# Patient Record
Sex: FEMALE | Race: WHITE | Employment: FULL TIME | URBAN - METROPOLITAN AREA
[De-identification: names, ages, dates, MRNs, and addresses within clinical notes are randomized per-mention and may not be internally consistent; named-entity substitution may affect disease eponyms.]

---

## 2017-01-13 DIAGNOSIS — G89.4 CHRONIC PAIN DISORDER: Primary | ICD-10-CM

## 2017-01-13 RX ORDER — TRAMADOL HYDROCHLORIDE 50 MG/1
100 TABLET ORAL 3 TIMES DAILY PRN
Qty: 180 TABLET | Refills: 2 | Status: SHIPPED | OUTPATIENT
Start: 2017-01-13 | End: 2017-04-11

## 2017-01-13 NOTE — TELEPHONE ENCOUNTER
checked 1.13.17, monthly fills  Routing refill request to provider for review/approval because:  Drug not on the [facility] refill protocol   Patricia Collier RN, BS  Message handled by Nurse Triage .

## 2017-01-17 ENCOUNTER — TELEPHONE (OUTPATIENT)
Dept: FAMILY MEDICINE | Facility: CLINIC | Age: 57
End: 2017-01-17

## 2017-01-17 NOTE — TELEPHONE ENCOUNTER
[facility], insurance only covers 15 tablets per month for Zolpidem 5 mg, needs PA, call [phone], no other information provided, routed to Jose A Davila MD, please advise linda NAGEL for processing  Yudith Lujan RN, BSN  Message handled by Nurse Triage.

## 2017-01-18 NOTE — TELEPHONE ENCOUNTER
There's no pa that justifies exception that i know of but lets try.     Hx bipolar with stable on daily hs zolpidem for two years along with her other meds.   Current Outpatient Prescriptions   Medication     traMADol (ULTRAM) 50 MG tablet     zolpidem (AMBIEN) 5 MG tablet     PARoxetine (PAXIL) 20 MG tablet     promethazine (PHENERGAN) 12.5 MG tablet     Omeprazole 20 MG tablet     No current facility-administered medications for this visit.

## 2017-01-28 NOTE — TELEPHONE ENCOUNTER
Patient calling to check on this.  States was told would have answer in 2 days.  Wants call ASAP.  Needs med as is out.  Sarita Elliott RN

## 2017-01-31 NOTE — TELEPHONE ENCOUNTER
No response, resubmitted via CMM, see nurse message below, LMOVM informing pt, call if ?'s  Yudith Lujan RN, BSN  Message handled by Nurse Triage.

## 2017-01-31 NOTE — TELEPHONE ENCOUNTER
Fax received from [employer], instructs to call [facility] at [phone], called, processed override over the phone    PA approved.  Effective date: 1/31/17-1/31/20 (36 months), ok for 30/30 or 90/90 quantity  PA reference #: [id number]  Pt. notified:   Yes,  LMOM that PA approved and patient to notify pharmacy, also informed [facility] of PA approval, $1.32 copay  Yudith Lujan RN, BSN  Message handled by Nurse Triage.

## 2017-02-11 DIAGNOSIS — G47.9 SLEEP DISORDER: Primary | ICD-10-CM

## 2017-02-11 NOTE — TELEPHONE ENCOUNTER
Pending Prescriptions:                       Disp   Refills    zolpidem (AMBIEN) 5 MG tablet             30 tab*2            Sig: Take 1 tablet (5 mg) by mouth nightly as needed           for sleep NEEDS VISIT    Controlled Substance Refill Request for Zolpidem  Problem List Complete:  No     PROVIDER TO CONSIDER COMPLETION OF PROBLEM LIST AND OVERVIEW/CONTROLLED SUBSTANCE AGREEMENT    Last Written Prescription Date:  11/15/16  Last Fill Quantity: 30,   # refills: 2    [place] [facility] [place] 09/06/2016    Future Office visit:     Controlled substance agreement on file: Yes:  Date 04/29/16.     Processing:  Fax Rx to [facility] pharmacy   checked in past 6 months?  No, route to KRISTY Larose

## 2017-02-15 RX ORDER — ZOLPIDEM TARTRATE 5 MG/1
5 TABLET ORAL
Qty: 30 TABLET | Refills: 2 | Status: SHIPPED | OUTPATIENT
Start: 2017-02-15 | End: 2017-04-21

## 2017-04-11 DIAGNOSIS — G89.21 CHRONIC PAIN DUE TO TRAUMA: ICD-10-CM

## 2017-04-11 DIAGNOSIS — G89.4 CHRONIC PAIN DISORDER: ICD-10-CM

## 2017-04-11 NOTE — TELEPHONE ENCOUNTER
Controlled Substance Refill Request for traMADol (ULTRAM) 50 MG tablet  Sig: Take 2 tablets (100 mg) by mouth 3 times daily as needed for pain    Problem List Complete:  No     PROVIDER TO CONSIDER COMPLETION OF PROBLEM LIST AND OVERVIEW/CONTROLLED SUBSTANCE AGREEMENT    Last Written Prescription Date:  1/13/17  Last Fill Quantity: 180,   # refills: 2    Last Office Visit with [facility] primary care provider: 9/6/2016      Future Office visit:     Controlled substance agreement on file: Yes:  Date 5/9/16.     [facility]   checked in past 6 months?  Yes 2/11/17      MELINDA Torrez  April 11, 2017  11:00 AM

## 2017-04-12 RX ORDER — TRAMADOL HYDROCHLORIDE 50 MG/1
TABLET ORAL
Qty: 180 TABLET | Refills: 0 | Status: SHIPPED | OUTPATIENT
Start: 2017-04-12 | End: 2017-05-12

## 2017-04-12 NOTE — TELEPHONE ENCOUNTER
Routing refill request to provider to review approval because:  Drug not on the [facility], [facility] or  Health refill protocol or controlled substance   UPDATED, AT [facility], PT DUE FOR 6 MONTH VISIT-** CONFIRM START DATE AND QUANTITY    LAST REFILL PER : 3/14/17  Yudith Lujan RN, BSN  Message handled by Nurse Triage.

## 2017-04-21 ENCOUNTER — OFFICE VISIT (OUTPATIENT)
Dept: FAMILY MEDICINE | Facility: CLINIC | Age: 57
End: 2017-04-21
Payer: COMMERCIAL

## 2017-04-21 VITALS
DIASTOLIC BLOOD PRESSURE: 82 MMHG | SYSTOLIC BLOOD PRESSURE: 130 MMHG | HEART RATE: 85 BPM | BODY MASS INDEX: 24.96 KG/M2 | RESPIRATION RATE: 16 BRPM | OXYGEN SATURATION: 98 % | WEIGHT: 150 LBS | TEMPERATURE: 99.2 F

## 2017-04-21 DIAGNOSIS — J01.00 ACUTE NON-RECURRENT MAXILLARY SINUSITIS: Primary | ICD-10-CM

## 2017-04-21 DIAGNOSIS — G47.9 SLEEP DISORDER: ICD-10-CM

## 2017-04-21 PROCEDURE — 99213 OFFICE O/P EST LOW 20 MIN: CPT | Performed by: PHYSICIAN ASSISTANT

## 2017-04-21 RX ORDER — ZOLPIDEM TARTRATE 5 MG/1
5 TABLET ORAL
Qty: 30 TABLET | Refills: 2 | Status: SHIPPED | OUTPATIENT
Start: 2017-04-21 | End: 2017-05-08

## 2017-04-21 ASSESSMENT — ANXIETY QUESTIONNAIRES
1. FEELING NERVOUS, ANXIOUS, OR ON EDGE: MORE THAN HALF THE DAYS
6. BECOMING EASILY ANNOYED OR IRRITABLE: MORE THAN HALF THE DAYS
IF YOU CHECKED OFF ANY PROBLEMS ON THIS QUESTIONNAIRE, HOW DIFFICULT HAVE THESE PROBLEMS MADE IT FOR YOU TO DO YOUR WORK, TAKE CARE OF THINGS AT HOME, OR GET ALONG WITH OTHER PEOPLE: SOMEWHAT DIFFICULT
3. WORRYING TOO MUCH ABOUT DIFFERENT THINGS: NEARLY EVERY DAY
5. BEING SO RESTLESS THAT IT IS HARD TO SIT STILL: MORE THAN HALF THE DAYS
2. NOT BEING ABLE TO STOP OR CONTROL WORRYING: MORE THAN HALF THE DAYS
GAD7 TOTAL SCORE: 16
7. FEELING AFRAID AS IF SOMETHING AWFUL MIGHT HAPPEN: MORE THAN HALF THE DAYS

## 2017-04-21 ASSESSMENT — PATIENT HEALTH QUESTIONNAIRE - PHQ9: 5. POOR APPETITE OR OVEREATING: NEARLY EVERY DAY

## 2017-04-21 NOTE — MR AVS SNAPSHOT
After Visit Summary   4/21/2017    Miguelina Soria    MRN: [id number]           Patient Information     Date Of Birth          1960        Visit Information        Provider Department      4/21/2017 8:45 AM Kizzy Pearce PA-C [facility]        Today's Diagnoses     Acute non-recurrent maxillary sinusitis    -  1    Sleep disorder           Follow-ups after your visit        Who to contact     If you have questions or need follow up information about today's clinic visit or your schedule please contact [facility] directly at [phone].  Normal or non-critical lab and imaging results will be communicated to you by TEAM INTERVALhart, letter or phone within 4 business days after the clinic has received the results. If you do not hear from us within 7 days, please contact the clinic through TEAM INTERVALhart or phone. If you have a critical or abnormal lab result, we will notify you by phone as soon as possible.  Submit refill requests through [employer] or call your pharmacy and they will forward the refill request to us. Please allow 3 business days for your refill to be completed.          Additional Information About Your Visit        MyChart Information     [employer] gives you secure access to your electronic health record. If you see a primary care provider, you can also send messages to your care team and make appointments. If you have questions, please call your primary care clinic.  If you do not have a primary care provider, please call [phone] and they will assist you.        Care EveryWhere ID     This is your Care EveryWhere ID. This could be used by other organizations to access your [place] medical records  [id number]        Your Vitals Were     Pulse Temperature Respirations Last Period Pulse Oximetry BMI (Body Mass Index)    85 99.2  F (37.3  C) (Oral) 16 01/01/2010 98% 24.96 kg/m2       Blood Pressure from Last 3 Encounters:   04/21/17 130/82    09/06/16 134/84   04/29/16 114/72    Weight from Last 3 Encounters:   04/21/17 150 lb (68 kg)   09/06/16 145 lb (65.8 kg)   04/29/16 149 lb (67.6 kg)              We Performed the Following     Asthma Action Plan (AAP)     DEPRESSION ACTION PLAN (DAP)          Today's Medication Changes          These changes are accurate as of: 4/21/17  9:04 AM.  If you have any questions, ask your nurse or doctor.               Start taking these medicines.        Dose/Directions    amoxicillin-clavulanate 875-125 MG per tablet   Commonly known as:  AUGMENTIN   Used for:  Acute non-recurrent maxillary sinusitis   Started by:  Kizzy Pearce PA-C        Dose:  1 tablet   Take 1 tablet by mouth 2 times daily   Quantity:  20 tablet   Refills:  0         These medicines have changed or have updated prescriptions.        Dose/Directions    zolpidem 5 MG tablet   Commonly known as:  AMBIEN   This may have changed:  additional instructions   Used for:  Sleep disorder   Changed by:  Kizzy Pearce PA-C        Dose:  5 mg   Take 1 tablet (5 mg) by mouth nightly as needed for sleep   Quantity:  30 tablet   Refills:  2            Where to get your medicines      These medications were sent to [facility] [address] [place] [address] [place], [place] - [address] [facility]  [address] [place] [place] [address]     Phone:  [phone]     amoxicillin-clavulanate 875-125 MG per tablet         Some of these will need a paper prescription and others can be bought over the counter.  Ask your nurse if you have questions.     Bring a paper prescription for each of these medications     zolpidem 5 MG tablet                Primary Care Provider Office Phone # Fax #    Jose A Davila [phone]       [address] [address]        Thank you!     Thank you for choosing [facility]  for your care. Our goal is always to provide you with  excellent care. Hearing back from our patients is one way we can continue to improve our services. Please take a few minutes to complete the written survey that you may receive in the mail after your visit with us. Thank you!             Your Updated Medication List - Protect others around you: Learn how to safely use, store and throw away your medicines at www.disposemymeds.org.          This list is accurate as of: 4/21/17  9:04 AM.  Always use your most recent med list.                   Brand Name Dispense Instructions for use    amoxicillin-clavulanate 875-125 MG per tablet    AUGMENTIN    20 tablet    Take 1 tablet by mouth 2 times daily       omeprazole 20 MG tablet     90 tablet    Take 20 mg by mouth daily.       PARoxetine 20 MG tablet    PAXIL    90 tablet    Take 1 tablet (20 mg) by mouth daily (with dinner) SEE MD       promethazine 12.5 MG tablet    PHENERGAN    56 tablet    Take 1 tablet (12.5 mg) by mouth 4 times daily as needed       traMADol 50 MG tablet    ULTRAM    180 tablet    TAKE 2 TABLETS BY MOUTH THREE TIMES DAILY AS NEEDED FOR PAIN       zolpidem 5 MG tablet    AMBIEN    30 tablet    Take 1 tablet (5 mg) by mouth nightly as needed for sleep

## 2017-04-21 NOTE — PROGRESS NOTES
SUBJECTIVE:                                                    Miguelina Soria is a 56 year old female who presents to clinic today for the following health issues:      Acute Illness   Acute illness concerns: URI  Onset: 3 weeks    Fever: no unsure    Chills/Sweats: YES- sweats    Headache (location?): YES    Sinus Pressure:YES    Conjunctivitis:  no    Ear Pain: no    Rhinorrhea: YES    Congestion: YES    Sore Throat: no     Cough: YES-productive of green sputum    Wheeze: no    Decreased Appetite: no    Nausea: no    Vomiting: no    Diarrhea:  no    Dysuria/Freq.: no    Fatigue/Achiness: YES    Sick/Strep Exposure: no     Therapies Tried and outcome: OTC cold meds-not effective      Needs refill on Ambien. Working well for pt. No SE.     Problem list and histories reviewed & adjusted, as indicated.  Additional history: as documented    Patient Active Problem List   Diagnosis     Insomnia     GERD (gastroesophageal reflux disease)     Hypertension goal BP (blood pressure) < 140/90     Anxiety     Hyperlipidemia LDL goal <160     Major depressive disorder, recurrent episode, in full remission (H)     Chronic pain     History reviewed. No pertinent surgical history.    Social History   Substance Use Topics     Smoking status: Former Smoker     Packs/day: 0.50     Years: 40.00     Types: Cigarettes     Quit date: 8/16/2012     Smokeless tobacco: Never Used     Alcohol use No      Comment: Quit on November 8th, 2007     Family History   Problem Relation Age of Onset     CANCER Maternal Grandmother      DIABETES Maternal Grandmother      CANCER Maternal Grandfather      CANCER Paternal Grandmother      Neurologic Disorder Paternal Grandfather            Reviewed and updated as needed this visit by clinical staff       Reviewed and updated as needed this visit by Provider         ROS:  Constitutional, HEENT, cardiovascular, pulmonary, gi and gu systems are negative, except as otherwise noted.    OBJECTIVE:                                                     /82 (BP Location: Right arm, Patient Position: Chair, Cuff Size: Adult Regular)  Pulse 85  Temp 99.2  F (37.3  C) (Oral)  Resp 16  Wt 150 lb (68 kg)  LMP 01/01/2010  SpO2 98%  BMI 24.96 kg/m2  Body mass index is 24.96 kg/(m^2).  GENERAL APPEARANCE: healthy, alert and no distress  HENT: ear canals and TM's normal, nasal mucosa edematous without rhinorrhea and maxillary sinus tenderness bilateral  RESP: lungs clear to auscultation - no rales, rhonchi or wheezes  CV: regular rates and rhythm, normal S1 S2, no S3 or S4 and no murmur, click or rub         ASSESSMENT/PLAN:                                                            1. Sleep disorder  Faxed.   - zolpidem (AMBIEN) 5 MG tablet; Take 1 tablet (5 mg) by mouth nightly as needed for sleep  Dispense: 30 tablet; Refill: 2    2. Acute non-recurrent maxillary sinusitis  Nasal saline,   Supportive cares  F/u as needed   - amoxicillin-clavulanate (AUGMENTIN) 875-125 MG per tablet; Take 1 tablet by mouth 2 times daily  Dispense: 20 tablet; Refill: 0        Kizzy Pearce PA-C, KRIS  [facility]

## 2017-04-21 NOTE — NURSING NOTE
"Chief Complaint   Patient presents with     URI       Initial Wt 150 lb (68 kg)  LMP 01/01/2010  BMI 24.96 kg/m2 Estimated body mass index is 24.96 kg/(m^2) as calculated from the following:    Height as of 9/6/16: 5' 5\" (1.651 m).    Weight as of this encounter: 150 lb (68 kg).  Medication Reconciliation: complete   Rosita Castellano, FIOR      "

## 2017-04-22 ASSESSMENT — ANXIETY QUESTIONNAIRES: GAD7 TOTAL SCORE: 16

## 2017-04-22 ASSESSMENT — ASTHMA QUESTIONNAIRES: ACT_TOTALSCORE: 25

## 2017-04-22 ASSESSMENT — PATIENT HEALTH QUESTIONNAIRE - PHQ9: SUM OF ALL RESPONSES TO PHQ QUESTIONS 1-9: 17

## 2017-05-05 ENCOUNTER — TELEPHONE (OUTPATIENT)
Dept: FAMILY MEDICINE | Facility: CLINIC | Age: 57
End: 2017-05-05

## 2017-05-05 NOTE — LETTER
[facility]  [address] [place] [id number] [phone]  May 25, 2017    Miguelina Soria  [address] [place] [address]    Dear Miguelina,    I care about your health and have reviewed your health plan. I have reviewed your medical conditions, medication list, and lab results and am making recommendations based on this review, to better manage your health.    You are in particular need of attention regarding:  -Breast Cancer Screening    I am recommending that you:  {recommendations:-schedule a MAMMOGRAM which is due. We have mammogram available at our clinic; Tuesday 8:00am-11:30am or Wednesday 2:00pm-4:15pm- to schedule call [phone].   Please disregard this reminder if you have had this exam elsewhere within the last year.  It would be helpful for us to have a copy of your mammogram report in our file so that we can best coordinate your care.    Here is a list of Health Maintenance topics that are due now or due soon:  Health Maintenance Due   Topic Date Due     URINE DRUG SCREEN Q1 YR  05/11/1973     ADVANCE DIRECTIVE PLANNING Q5 YRS  05/11/1976     HEPATITIS C SCREENING  05/11/1976     MAMMO SCREEN Q2 YR (SYSTEM ASSIGNED)  10/05/2011     MICROALBUMIN Q1 YEAR  03/14/2013       Please call us at [phone] (or use "[employer]") to address the above recommendations.     Thank you for trusting [facility] and we appreciate the opportunity to serve you.  We look forward to supporting your healthcare needs in the future.    Healthy Regards,    Jose A Davila MD

## 2017-05-08 DIAGNOSIS — G47.9 SLEEP DISORDER: ICD-10-CM

## 2017-05-08 NOTE — TELEPHONE ENCOUNTER
Controlled Substance Refill Request for Ambien 5 mg   Problem List Complete:  No     PROVIDER TO CONSIDER COMPLETION OF PROBLEM LIST AND OVERVIEW/CONTROLLED SUBSTANCE AGREEMENT    Last Written Prescription Date:  04/21/17  Last Fill Quantity: 30,   # refills: 2    Last Office Visit with [facility] primary care provider: 04/21/17 Dr. Davila     Future Office visit:     Controlled substance agreement on file: No.     Processing:  Fax Rx to [facility] pharmacy   checked in past 6 months?  No, route to RN

## 2017-05-09 RX ORDER — ZOLPIDEM TARTRATE 5 MG/1
TABLET ORAL
Qty: 30 TABLET | Refills: 0 | Status: SHIPPED | OUTPATIENT
Start: 2017-05-09 | End: 2017-09-04

## 2017-05-09 NOTE — TELEPHONE ENCOUNTER
Routing refill request to provider for review/approval because:  Drug not on the [facility] refill protocol     [place] reviewed:  reviewed- no concerns    Last fills:  4/10/2017, #30      [place]  [email or web address]    Juli Vance RN, BSN, PHN

## 2017-05-12 DIAGNOSIS — G89.4 CHRONIC PAIN DISORDER: ICD-10-CM

## 2017-05-12 NOTE — TELEPHONE ENCOUNTER
Controlled Substance Refill Request for traMADol (ULTRAM) 50 MG tablet    Sig: TAKE 2 TABLETS BY MOUTH THREE TIMES DAILY AS NEEDED FOR PAIN    Problem List Complete:  Yes  Overview Addendum 4/12/2017  1:43 PM by Yudith Lujan RN      Patient is followed by MARY ANGELES for ongoing prescription of narcotic pain medicine. Med: tramadol.   Maximum use per month: 180  Expected duration: longterm  Narcotic agreement on file: YES  Clinic visit recommended: Q 3 months  Patient is followed by MARY ANGELES for ongoing prescription of pain medication. All refills should be approved by this provider, or covering partner.     Medication(s): tramadol.      Clinic visit frequency required: Q 3 months      Controlled substance agreement on file: Yes  Date(s): 2016     Pain Clinic evaluation in the past: No        Last [facility] website verification: 4/12/17       Last Written Prescription Date:  4/12/17  Last Fill Quantity: 180,   # refills: 0    Last Office Visit with [facility] primary care provider: 4/21/2017    Clinic visit frequency required: Q 3 months     Future Office visit:     Controlled substance agreement on file: Yes:  Date 5/9/16.     [facility]  [facility]   checked in past 6 months?  Yes 4/12/17      MELINDA Torrez  May 12, 2017  11:34 AM

## 2017-05-16 NOTE — TELEPHONE ENCOUNTER
Routing refill request to provider to review approval because:  Drug not on the [facility] or [facility] refill protocol or controlled substance  Yudith Lujan RN, BSN  Message handled by Nurse Triage.

## 2017-05-17 RX ORDER — TRAMADOL HYDROCHLORIDE 50 MG/1
TABLET ORAL
Qty: 180 TABLET | Refills: 0 | Status: SHIPPED | OUTPATIENT
Start: 2017-05-17 | End: 2017-06-24

## 2017-06-24 DIAGNOSIS — G89.4 CHRONIC PAIN DISORDER: ICD-10-CM

## 2017-06-24 RX ORDER — TRAMADOL HYDROCHLORIDE 50 MG/1
100 TABLET ORAL 3 TIMES DAILY PRN
Qty: 180 TABLET | Refills: 0 | Status: SHIPPED | OUTPATIENT
Start: 2017-06-24 | End: 2017-08-01

## 2017-06-24 NOTE — TELEPHONE ENCOUNTER
Routing refill request to provider for review/approval because:  Drug not on the [facility] refill protocol     Patricia Collier RN, BS  Clinical Nurse Triage.

## 2017-06-24 NOTE — TELEPHONE ENCOUNTER
Pending Prescriptions:                       Disp   Refills    traMADol (ULTRAM) 50 MG tablet [Pharmacy *180 ta*0            Sig: TAKE 2 TABLETS BY MOUTH THREE TIMES DAILY AS           NEEDED FOR PAIN              Last Written Prescription Date:  5/17/2017  Last Fill Quantity: 180,   # refills: 0  [facility] [place] [facility] prescribing provider: 4/21/2017Ramses      Future Office visit:       Routing refill request to provider for review/approval because:  Drug not on the [facility] or [facility] refill protocol or controlled substance

## 2017-07-06 ENCOUNTER — RADIANT APPOINTMENT (OUTPATIENT)
Dept: MAMMOGRAPHY | Facility: CLINIC | Age: 57
End: 2017-07-06
Payer: COMMERCIAL

## 2017-07-06 DIAGNOSIS — Z12.31 VISIT FOR SCREENING MAMMOGRAM: ICD-10-CM

## 2017-07-06 PROCEDURE — G0202 SCR MAMMO BI INCL CAD: HCPCS | Mod: TC

## 2017-07-11 ENCOUNTER — OFFICE VISIT (OUTPATIENT)
Dept: FAMILY MEDICINE | Facility: CLINIC | Age: 57
End: 2017-07-11
Payer: COMMERCIAL

## 2017-07-11 DIAGNOSIS — Z91.89 ENCOUNTER FOR HCV SCREENING TEST FOR HIGH RISK PATIENT: ICD-10-CM

## 2017-07-11 DIAGNOSIS — G89.29 OTHER CHRONIC PAIN: ICD-10-CM

## 2017-07-11 DIAGNOSIS — I10 ESSENTIAL HYPERTENSION: Primary | ICD-10-CM

## 2017-07-11 DIAGNOSIS — Z11.59 ENCOUNTER FOR HCV SCREENING TEST FOR HIGH RISK PATIENT: ICD-10-CM

## 2017-07-11 PROCEDURE — 36415 COLL VENOUS BLD VENIPUNCTURE: CPT | Performed by: FAMILY MEDICINE

## 2017-07-11 PROCEDURE — 86803 HEPATITIS C AB TEST: CPT | Performed by: FAMILY MEDICINE

## 2017-07-11 PROCEDURE — 99000 SPECIMEN HANDLING OFFICE-LAB: CPT | Performed by: FAMILY MEDICINE

## 2017-07-11 PROCEDURE — 80307 DRUG TEST PRSMV CHEM ANLYZR: CPT | Mod: 90 | Performed by: FAMILY MEDICINE

## 2017-07-11 PROCEDURE — 99214 OFFICE O/P EST MOD 30 MIN: CPT | Performed by: FAMILY MEDICINE

## 2017-07-11 PROCEDURE — 82043 UR ALBUMIN QUANTITATIVE: CPT | Performed by: FAMILY MEDICINE

## 2017-07-11 PROCEDURE — 80076 HEPATIC FUNCTION PANEL: CPT | Performed by: FAMILY MEDICINE

## 2017-07-11 RX ORDER — METOPROLOL SUCCINATE 25 MG/1
25 TABLET, EXTENDED RELEASE ORAL DAILY
Qty: 90 TABLET | Refills: 3 | Status: SHIPPED | OUTPATIENT
Start: 2017-07-11 | End: 2018-06-24

## 2017-07-11 NOTE — NURSING NOTE
"Chief Complaint   Patient presents with     Recheck Medication       Initial BP (!) 168/104 (BP Location: Left arm, Patient Position: Chair, Cuff Size: Adult Regular)  Pulse 86  Temp 98.2  F (36.8  C) (Oral)  Resp 14  Ht 5' 5\" (1.651 m)  Wt 154 lb 8 oz (70.1 kg)  LMP 01/01/2010  SpO2 98%  BMI 25.71 kg/m2 Estimated body mass index is 25.71 kg/(m^2) as calculated from the following:    Height as of this encounter: 5' 5\" (1.651 m).    Weight as of this encounter: 154 lb 8 oz (70.1 kg).  Medication Reconciliation: complete   Arcadio Guerin CMA       "

## 2017-07-11 NOTE — MR AVS SNAPSHOT
"              After Visit Summary   7/11/2017    Miguelina Soria    MRN: [id number]           Patient Information     Date Of Birth          1960        Visit Information        Provider Department      7/11/2017 10:00 AM Jose A Davila MD [facility]        Today's Diagnoses     Essential hypertension    -  1    Encounter for HCV screening test for high risk patient        Other chronic pain           Follow-ups after your visit        Who to contact     If you have questions or need follow up information about today's clinic visit or your schedule please contact [facility] directly at [phone].  Normal or non-critical lab and imaging results will be communicated to you by MyChart, letter or phone within 4 business days after the clinic has received the results. If you do not hear from us within 7 days, please contact the clinic through Ecommohart or phone. If you have a critical or abnormal lab result, we will notify you by phone as soon as possible.  Submit refill requests through [employer] or call your pharmacy and they will forward the refill request to us. Please allow 3 business days for your refill to be completed.          Additional Information About Your Visit        MyChart Information     [employer] gives you secure access to your electronic health record. If you see a primary care provider, you can also send messages to your care team and make appointments. If you have questions, please call your primary care clinic.  If you do not have a primary care provider, please call [phone] and they will assist you.        Care EveryWhere ID     This is your Care EveryWhere ID. This could be used by other organizations to access your [place] medical records  [id number]        Your Vitals Were     Pulse Temperature Respirations Height Last Period Pulse Oximetry    86 98.2  F (36.8  C) (Oral) 14 5' 5\" (1.651 m) 01/01/2010 98%    BMI (Body Mass Index)             "       25.71 kg/m2            Blood Pressure from Last 3 Encounters:   07/11/17 (!) 168/104   04/21/17 130/82   09/06/16 134/84    Weight from Last 3 Encounters:   07/11/17 154 lb 8 oz (70.1 kg)   04/21/17 150 lb (68 kg)   09/06/16 145 lb (65.8 kg)              We Performed the Following     Albumin Random Urine Quantitative     Drug  Screen Comprehensive, Urine w/o Reported Meds (Pain Care Package)     Hepatic panel     Hepatitis C Screen Reflex to HCV RNA Quant and Genotype          Today's Medication Changes          These changes are accurate as of: 7/11/17 10:37 AM.  If you have any questions, ask your nurse or doctor.               Start taking these medicines.        Dose/Directions    metoprolol 25 MG 24 hr tablet   Commonly known as:  TOPROL-XL   Used for:  Essential hypertension   Started by:  Jose A Davila MD        Dose:  25 mg   Take 1 tablet (25 mg) by mouth daily   Quantity:  90 tablet   Refills:  3         Stop taking these medicines if you haven't already. Please contact your care team if you have questions.     PARoxetine 20 MG tablet   Commonly known as:  PAXIL   Stopped by:  Jose A Davila MD                Where to get your medicines      These medications were sent to [facility] [address] [place] [id number]    Hours:  24-hours Phone:  [phone]     metoprolol 25 MG 24 hr tablet                Primary Care Provider Office Phone # Fax #    Jose A Davila [phone]       [address] [address]        Equal Access to Services     VINICIUS HOUGH AH: Hadii opal espinal hadasho Soomaali, waaxda luqadaha, qaybta kaalmada adeegyajorge, monika fierro. So [facility] [phone].    ATENCIÓN: Si habla español, tiene a roberts disposición servicios gratuitos de asistencia lingüística. Llame al  [phone].    We comply with applicable federal civil rights laws and [place] laws. We do not discriminate on the basis of race, color, national origin, age, disability sex, sexual orientation or gender identity.            Thank you!     Thank you for choosing [facility]  for your care. Our goal is always to provide you with excellent care. Hearing back from our patients is one way we can continue to improve our services. Please take a few minutes to complete the written survey that you may receive in the mail after your visit with us. Thank you!             Your Updated Medication List - Protect others around you: Learn how to safely use, store and throw away your medicines at www.disposemymeds.org.          This list is accurate as of: 7/11/17 10:37 AM.  Always use your most recent med list.                   Brand Name Dispense Instructions for use Diagnosis    metoprolol 25 MG 24 hr tablet    TOPROL-XL    90 tablet    Take 1 tablet (25 mg) by mouth daily    Essential hypertension       omeprazole 20 MG tablet     90 tablet    Take 20 mg by mouth daily.    GERD (gastroesophageal reflux disease)       traMADol 50 MG tablet    ULTRAM    180 tablet    Take 2 tablets (100 mg) by mouth 3 times daily as needed for moderate pain SEE MD IN JULY    Chronic pain disorder       zolpidem 5 MG tablet    AMBIEN    30 tablet    TAKE 1 TABLET BY MOUTH NIGHTLY AS NEEDED FOR SLEEP. NEEDS VISIT    Sleep disorder

## 2017-07-11 NOTE — PROGRESS NOTES
"  SUBJECTIVE:                                                    Miguelina Soria is a 57 year old female who presents to clinic today for the following health issues:    Subjective:   Miguelina Soria is a 57 year old female with hypertension.  Current Outpatient Prescriptions   Medication Sig Dispense Refill     metoprolol (TOPROL-XL) 25 MG 24 hr tablet Take 1 tablet (25 mg) by mouth daily 90 tablet 3     traMADol (ULTRAM) 50 MG tablet Take 2 tablets (100 mg) by mouth 3 times daily as needed for moderate pain SEE MD IN JULY 180 tablet 0     zolpidem (AMBIEN) 5 MG tablet TAKE 1 TABLET BY MOUTH NIGHTLY AS NEEDED FOR SLEEP. NEEDS VISIT 30 tablet 0     Omeprazole 20 MG tablet Take 20 mg by mouth daily. 90 tablet 1      Hypertension ROS: taking medications as instructed, no medication side effects noted, no TIA's, no chest pain on exertion, no dyspnea on exertion, no swelling of ankles.   New concerns: labile blood pressure.     Objective:   /88 (BP Location: Left arm, Patient Position: Chair, Cuff Size: Adult Regular)  Pulse 86  Temp 98.2  F (36.8  C) (Oral)  Resp 14  Ht 5' 5\" (1.651 m)  Wt 154 lb 8 oz (70.1 kg)  LMP 01/01/2010  SpO2 98%  BMI 25.71 kg/m2   Appearance healthy, alert and cooperative.  General exam BP noted to be borderline elevated today in office, S1, S2 normal, no gallop, no murmur, chest clear, no JVD, no HSM, no edema.   Lab review: labs reviewed, I note that renal functions  normal.     Assessment:    Hypertension control uncertain.     Plan:   orders and follow up as documented in [facility], reviewed diet, exercise and weight control, recommended sodium restriction.    Medication Followup of all medications    Taking Medication as prescribed: yes    Side Effects:  None    Medication Helping Symptoms:  yes       Patient would like her Paxil dosage increased.    Problem list and histories reviewed & adjusted, as indicated.  Additional history: as documented    BP Readings from Last 3 " "Encounters:   07/11/17 138/88   04/21/17 130/82   09/06/16 134/84    Wt Readings from Last 3 Encounters:   07/11/17 154 lb 8 oz (70.1 kg)   04/21/17 150 lb (68 kg)   09/06/16 145 lb (65.8 kg)                    Reviewed and updated as needed this visit by clinical staff       Reviewed and updated as needed this visit by Provider         ROS:  Constitutional, HEENT, cardiovascular, pulmonary, GI, , musculoskeletal, neuro, skin, endocrine and psych systems are negative, except as otherwise noted.    OBJECTIVE:     /88 (BP Location: Left arm, Patient Position: Chair, Cuff Size: Adult Regular)  Pulse 86  Temp 98.2  F (36.8  C) (Oral)  Resp 14  Ht 5' 5\" (1.651 m)  Wt 154 lb 8 oz (70.1 kg)  LMP 01/01/2010  SpO2 98%  BMI 25.71 kg/m2  Body mass index is 25.71 kg/(m^2).   GENERAL: healthy, alert and no distress  EYES: Eyes grossly normal to inspection, PERRL and conjunctivae and sclerae normal  HENT: ear canals and TM's normal, nose and mouth without ulcers or lesions  NECK: no adenopathy, no asymmetry, masses, or scars and thyroid normal to palpation  RESP: lungs clear to auscultation - no rales, rhonchi or wheezes  CV: regular rate and rhythm, normal S1 S2, no S3 or S4, no murmur, click or rub, no peripheral edema and peripheral pulses strong  ABDOMEN: soft, nontender, no hepatosplenomegaly, no masses and bowel sounds normal  MS: no gross musculoskeletal defects noted, no edema  SKIN: no suspicious lesions or rashes  NEURO: Normal strength and tone, mentation intact and speech normal  PSYCH: mentation appears normal, affect normal/bright        ASSESSMENT:       PLAN:       (I10) Essential hypertension  (primary encounter diagnosis)  Comment:   Plan: metoprolol (TOPROL-XL) 25 MG 24 hr tablet,         Albumin Random Urine Quantitative        Still labile    (Z11.59,  Z91.89) Encounter for HCV screening test for high risk patient  Comment:   Plan: Hepatitis C Screen Reflex to HCV RNA Quant and         " Genotype            (G89.29) Other chronic pain  Comment:   Plan: Drug  Screen Comprehensive, Urine w/o Reported         Meds (Pain Care Package), Hepatic panel        Reviewed her osteoarthritis and opioid therapy and agreement     Work on weight loss  Regular exercise  Follow up in four months for pain mgmt and bp   Jose A Davila MD  [facility]

## 2017-07-12 VITALS
SYSTOLIC BLOOD PRESSURE: 138 MMHG | HEART RATE: 86 BPM | DIASTOLIC BLOOD PRESSURE: 88 MMHG | BODY MASS INDEX: 25.74 KG/M2 | RESPIRATION RATE: 14 BRPM | WEIGHT: 154.5 LBS | TEMPERATURE: 98.2 F | OXYGEN SATURATION: 98 % | HEIGHT: 65 IN

## 2017-07-12 LAB — HCV AB SERPL QL IA: NORMAL

## 2017-07-13 LAB
ALBUMIN SERPL-MCNC: 3.8 G/DL (ref 3.4–5)
ALP SERPL-CCNC: 85 U/L (ref 40–150)
ALT SERPL W P-5'-P-CCNC: 22 U/L (ref 0–50)
AST SERPL W P-5'-P-CCNC: 14 U/L (ref 0–45)
BILIRUB DIRECT SERPL-MCNC: <0.1 MG/DL (ref 0–0.2)
BILIRUB SERPL-MCNC: 0.2 MG/DL (ref 0.2–1.3)
CREAT UR-MCNC: 48 MG/DL
MICROALBUMIN UR-MCNC: 9 MG/L
MICROALBUMIN/CREAT UR: 18.88 MG/G CR (ref 0–25)
PROT SERPL-MCNC: 7.1 G/DL (ref 6.8–8.8)

## 2017-07-17 LAB — COMPREHEN DRUG ANALYSIS UR: NORMAL

## 2017-08-01 DIAGNOSIS — G89.4 CHRONIC PAIN DISORDER: ICD-10-CM

## 2017-08-01 NOTE — TELEPHONE ENCOUNTER
Controlled Substance Refill Request for Tramadol 50mg  Problem List Complete:  No     PROVIDER TO CONSIDER COMPLETION OF PROBLEM LIST AND OVERVIEW/CONTROLLED SUBSTANCE AGREEMENT    Last Written Prescription Date:  06/24/2017  Last Fill Quantity: 180,06/26/2017   # refills: 0    Last Office Visit with [facility] primary care provider: 07/11/2017-Dr Davila    Future Office visit:     Controlled substance agreement on file: Yes:  Date 8/24/2016.     Processing:  Fax Rx to [facility] pharmacy     checked in past 6 months?  No, route to RN

## 2017-08-03 NOTE — TELEPHONE ENCOUNTER
[place] reviewed:  reviewed- recommend provider review    [place]:  [email or web address]

## 2017-08-04 RX ORDER — TRAMADOL HYDROCHLORIDE 50 MG/1
TABLET ORAL
Qty: 180 TABLET | Refills: 0 | Status: SHIPPED | OUTPATIENT
Start: 2017-08-04 | End: 2017-09-04

## 2017-09-04 DIAGNOSIS — G47.9 SLEEP DISORDER: ICD-10-CM

## 2017-09-04 DIAGNOSIS — G89.4 CHRONIC PAIN DISORDER: ICD-10-CM

## 2017-09-05 NOTE — TELEPHONE ENCOUNTER
Controlled Substance Refill Request for ZOLPIDEM 5MG TABLETS    Problem List Complete:  No     PROVIDER TO CONSIDER COMPLETION OF PROBLEM LIST AND OVERVIEW/CONTROLLED SUBSTANCE AGREEMENT    Last Written Prescription Date:  05/09/17  Last Fill Quantity: 30,   # refills: 0    Last Office Visit with [facility] primary care provider: 07/11/17 Kizzy Asher Office visit:     Controlled substance agreement on file: No.     Processing:  Fax Rx to [facility] pharmacy     checked in past 6 months?  No, route to RN

## 2017-09-05 NOTE — TELEPHONE ENCOUNTER
Controlled Substance Refill Request for TRAMADOL 50MG TABLETS    Problem List Complete:  No     PROVIDER TO CONSIDER COMPLETION OF PROBLEM LIST AND OVERVIEW/CONTROLLED SUBSTANCE AGREEMENT    Last Written Prescription Date:  08/04/17  Last Fill Quantity: 180,   # refills: 0    [place] [facility] [place]: 07/11/17 Dr Davila    Future Office visit:     Controlled substance agreement on file: No.     Processing:  Fax Rx to [facility] pharmacy     checked in past 6 months?  No, route to RN

## 2017-09-06 RX ORDER — TRAMADOL HYDROCHLORIDE 50 MG/1
TABLET ORAL
Qty: 180 TABLET | Refills: 0 | Status: SHIPPED | OUTPATIENT
Start: 2017-09-06 | End: 2017-10-10

## 2017-09-06 RX ORDER — ZOLPIDEM TARTRATE 5 MG/1
TABLET ORAL
Qty: 30 TABLET | Refills: 0 | Status: SHIPPED | OUTPATIENT
Start: 2017-09-06 | End: 2017-10-02

## 2017-09-06 NOTE — TELEPHONE ENCOUNTER
RX [place]) reviewed:  reviewed- no concerns    [place] profile:  [email or web address]

## 2017-09-06 NOTE — TELEPHONE ENCOUNTER
Patient is followed by MARY ANGELES for ongoing prescription of pain medication.  All refills should be approved by this provider, or covering partner.    Medication(s): tramadol.     Clinic visit frequency required: Q 3 months     Controlled substance agreement on file: Yes       Date(s): 2016    Pain Clinic evaluation in the past: No      Last [facility] website verification:  4/12/17   [email or web address]    Prescription approved per [facility] Refill Protocol  Patricia Collier RN BS

## 2017-10-02 DIAGNOSIS — G47.9 SLEEP DISORDER: ICD-10-CM

## 2017-10-03 DIAGNOSIS — G47.9 SLEEP DISORDER: ICD-10-CM

## 2017-10-03 DIAGNOSIS — G89.29 OTHER CHRONIC PAIN: ICD-10-CM

## 2017-10-03 RX ORDER — ZOLPIDEM TARTRATE 5 MG/1
TABLET ORAL
Qty: 30 TABLET | Refills: 0 | Status: SHIPPED | OUTPATIENT
Start: 2017-10-03 | End: 2017-10-03

## 2017-10-03 NOTE — TELEPHONE ENCOUNTER
Routing refill request to provider to review approval because:  Drug not on the [facility], [facility] or [facility] refill protocol or controlled substance  Yudith Lujan RN, BSN  Message handled by Nurse Triage.

## 2017-10-03 NOTE — TELEPHONE ENCOUNTER
ZOLPIDEM 5MG TABLETS        Last Written Prescription Date: 09-  Last Fill Quantity: 09-,  #30 refills: 0   Last Office Visit with G, P or [facility] prescribing provider: 07/11/2017 Dr. Davila.

## 2017-10-04 RX ORDER — ZOLPIDEM TARTRATE 5 MG/1
TABLET ORAL
Qty: 30 TABLET | Refills: 0 | Status: SHIPPED | OUTPATIENT
Start: 2017-10-04 | End: 2017-11-03

## 2017-10-04 NOTE — TELEPHONE ENCOUNTER
Routing refill request to provider for review/approval because:  Drug not on the [facility] refill protocol   : 10.4.17, monthly fills    Patricia Collier RN, BS  Clinical Nurse Triage.

## 2017-10-04 NOTE — TELEPHONE ENCOUNTER
Controlled Substance Refill Request for ZOLPIDEM 5MG TABLETS    Problem List Complete:  No     PROVIDER TO CONSIDER COMPLETION OF PROBLEM LIST AND OVERVIEW/CONTROLLED SUBSTANCE AGREEMENT    Last Written Prescription Date:  10/03/17  Last Fill Quantity: 30,   # refills: 0    [place] [facility] [place] 07/11/17 Dr Davila    Future Office visit:     Controlled substance agreement on file: No.     Processing:  Fax Rx to [facility] pharmacy     checked in past 6 months?  No, route to RN

## 2017-10-10 DIAGNOSIS — G89.4 CHRONIC PAIN DISORDER: ICD-10-CM

## 2017-10-10 NOTE — TELEPHONE ENCOUNTER
Controlled Substance Refill Request for traMADol (ULTRAM) 50 MG tablet  Problem List Complete:  Yes  ________________________________________________________________________________    Patient is followed by MARY ANGELES for ongoing prescription of narcotic pain medicine.  Med: tramadol.   Maximum use per month: 180  Expected duration: longterm  Narcotic agreement on file: YES  Clinic visit recommended: Q 3 months  Patient is followed by MARY ANGELES for ongoing prescription of pain medication.  All refills should be approved by this provider, or covering partner.     Medication(s): tramadol.      Clinic visit frequency required: Q 3 months      Controlled substance agreement on file: Yes       Date(s): 2016     Pain Clinic evaluation in the past: No        Last [facility] website verification:  10.4.17  ________________________________________________________________________________    Last Written Prescription Date:  9/6/17  Last Fill Quantity: 180,   # refills: 0    Last Office Visit with [facility] primary care provider: 7/11/2017    Clinic visit frequency required: Q 3 months     Future Office visit:     Controlled substance agreement on file: Yes:  Date 5/9/16.     Processing:  Fax Rx to [facility] pharmacy     checked in past 6 months?  Yes 10/4/17- Zolpidem fill.      MELINDA Torrez  October 10, 2017  4:40 PM

## 2017-10-12 NOTE — TELEPHONE ENCOUNTER
RX [place] program ([place]) reviewed:  reviewed- no concerns    [place] profile:  [email or web address]      Last Filled- Patient due for follow up per CS contract    9/7/2017, #180  8/4/2017, #180    Zolpidem  10/3/2017, #30  9/6/2017, #30  8/7/2017, #30    Juli HUMMEL RN, BSN, PHN  Dominique Gaytan

## 2017-10-13 RX ORDER — TRAMADOL HYDROCHLORIDE 50 MG/1
TABLET ORAL
Qty: 180 TABLET | Refills: 0 | Status: SHIPPED | OUTPATIENT
Start: 2017-10-13 | End: 2017-11-30

## 2017-11-03 DIAGNOSIS — G47.9 SLEEP DISORDER: ICD-10-CM

## 2017-11-06 RX ORDER — ZOLPIDEM TARTRATE 5 MG/1
TABLET ORAL
Qty: 30 TABLET | Refills: 0 | Status: SHIPPED | OUTPATIENT
Start: 2017-11-06 | End: 2017-12-04

## 2017-11-06 NOTE — TELEPHONE ENCOUNTER
Routing refill request to provider for review/approval because:  Drug not on the [facility] refill protocol   : 10.4.17    Patricia Collier RN, BS  Clinical Nurse Triage.

## 2017-11-06 NOTE — TELEPHONE ENCOUNTER
Controlled Substance Refill Request for zolpidem (AMBIEN) 5 MG tablet  Problem List Complete:  No, not for this medication.    Patient is followed by MARY ANGELES for ongoing prescription of narcotic pain medicine.  Med: tramadol.   Maximum use per month: 180  Expected duration: longterm  Narcotic agreement on file: YES  Clinic visit recommended: Q 3 months  Patient is followed by MARY ANGELES for ongoing prescription of pain medication.  All refills should be approved by this provider, or covering partner.     Medication(s): tramadol.      Clinic visit frequency required: Q 3 months      Controlled substance agreement on file: Yes       Date(s): 2016     Pain Clinic evaluation in the past: No        Last [facility] website verification:  10.4.17    PROVIDER TO CONSIDER COMPLETION OF PROBLEM LIST AND OVERVIEW/CONTROLLED SUBSTANCE AGREEMENT    Last Written Prescription Date:  10/4/17  Last Fill Quantity: 30,   # refills: 0    Last Office Visit with [facility] primary care provider: 7/11/2017      Future Office visit:     Controlled substance agreement on file: Yes:  Date 5/9/16.     Processing:  Fax Rx to [facility]     checked in past 6 months?  Yes 10/4/17      MELINDA Torrez  November 6, 2017  8:50 AM

## 2017-11-13 DIAGNOSIS — F33.0 MILD RECURRENT MAJOR DEPRESSION (H): ICD-10-CM

## 2017-11-15 RX ORDER — PAROXETINE 20 MG/1
TABLET, FILM COATED ORAL
Qty: 90 TABLET | Refills: 0 | Status: SHIPPED | OUTPATIENT
Start: 2017-11-15 | End: 2018-02-11

## 2017-11-15 NOTE — TELEPHONE ENCOUNTER
Disp Refills Start End EDENILSON   PARoxetine (PAXIL) 20 MG tablet (Discontinued) 90 tablet 3 9/6/2016 7/11/2017 No   Sig: Take 1 tablet (20 mg) by mouth daily (with dinner) SEE MD   Class: E-Prescribe   Route: Oral   Reason for Discontinue: Dose adjustment     PHQ-9 SCORE 4/29/2016 9/6/2016 4/21/2017   Total Score - - -   Total Score 16 17 17     Routing refill request to provider for review/approval because:  Med noted dose adjusted at office visit date, but I don't see mentioned in office note or other antidepressant ordered  PHQ 9 > 4    Patricia Collier RN, BS  Clinical Nurse Triage.

## 2017-11-29 ENCOUNTER — OFFICE VISIT (OUTPATIENT)
Dept: FAMILY MEDICINE | Facility: CLINIC | Age: 57
End: 2017-11-29
Payer: COMMERCIAL

## 2017-11-29 ENCOUNTER — RADIANT APPOINTMENT (OUTPATIENT)
Dept: GENERAL RADIOLOGY | Facility: CLINIC | Age: 57
End: 2017-11-29
Attending: NURSE PRACTITIONER
Payer: COMMERCIAL

## 2017-11-29 VITALS
BODY MASS INDEX: 25.81 KG/M2 | HEIGHT: 65 IN | RESPIRATION RATE: 16 BRPM | TEMPERATURE: 98.2 F | OXYGEN SATURATION: 98 % | DIASTOLIC BLOOD PRESSURE: 100 MMHG | HEART RATE: 76 BPM | WEIGHT: 154.9 LBS | SYSTOLIC BLOOD PRESSURE: 150 MMHG

## 2017-11-29 DIAGNOSIS — Z23 NEED FOR PROPHYLACTIC VACCINATION AND INOCULATION AGAINST INFLUENZA: ICD-10-CM

## 2017-11-29 DIAGNOSIS — M79.642 PAIN OF LEFT HAND: ICD-10-CM

## 2017-11-29 DIAGNOSIS — S63.502A SPRAIN OF LEFT WRIST, INITIAL ENCOUNTER: Primary | ICD-10-CM

## 2017-11-29 PROCEDURE — 99213 OFFICE O/P EST LOW 20 MIN: CPT | Mod: 25 | Performed by: NURSE PRACTITIONER

## 2017-11-29 PROCEDURE — 90471 IMMUNIZATION ADMIN: CPT | Performed by: NURSE PRACTITIONER

## 2017-11-29 PROCEDURE — 73130 X-RAY EXAM OF HAND: CPT | Mod: LT

## 2017-11-29 PROCEDURE — 90686 IIV4 VACC NO PRSV 0.5 ML IM: CPT | Performed by: NURSE PRACTITIONER

## 2017-11-29 ASSESSMENT — PATIENT HEALTH QUESTIONNAIRE - PHQ9
SUM OF ALL RESPONSES TO PHQ QUESTIONS 1-9: 10
5. POOR APPETITE OR OVEREATING: MORE THAN HALF THE DAYS

## 2017-11-29 ASSESSMENT — ANXIETY QUESTIONNAIRES
3. WORRYING TOO MUCH ABOUT DIFFERENT THINGS: MORE THAN HALF THE DAYS
5. BEING SO RESTLESS THAT IT IS HARD TO SIT STILL: SEVERAL DAYS
6. BECOMING EASILY ANNOYED OR IRRITABLE: MORE THAN HALF THE DAYS
GAD7 TOTAL SCORE: 12
2. NOT BEING ABLE TO STOP OR CONTROL WORRYING: MORE THAN HALF THE DAYS
IF YOU CHECKED OFF ANY PROBLEMS ON THIS QUESTIONNAIRE, HOW DIFFICULT HAVE THESE PROBLEMS MADE IT FOR YOU TO DO YOUR WORK, TAKE CARE OF THINGS AT HOME, OR GET ALONG WITH OTHER PEOPLE: SOMEWHAT DIFFICULT
7. FEELING AFRAID AS IF SOMETHING AWFUL MIGHT HAPPEN: SEVERAL DAYS
1. FEELING NERVOUS, ANXIOUS, OR ON EDGE: MORE THAN HALF THE DAYS

## 2017-11-29 NOTE — PROGRESS NOTES

## 2017-11-29 NOTE — NURSING NOTE
"Chief Complaint   Patient presents with     Musculoskeletal Problem     arm pain        Initial /90 (BP Location: Right arm, Patient Position: Chair, Cuff Size: Adult Regular)  Pulse 76  Temp 98.2  F (36.8  C) (Oral)  Resp 16  Ht 5' 5\" (1.651 m)  Wt 154 lb 14.4 oz (70.3 kg)  LMP 01/01/2010  SpO2 98%  Breastfeeding? No  BMI 25.78 kg/m2 Estimated body mass index is 25.78 kg/(m^2) as calculated from the following:    Height as of this encounter: 5' 5\" (1.651 m).    Weight as of this encounter: 154 lb 14.4 oz (70.3 kg).  Medication Reconciliation: complete      Health Maintenance addressed:  NONE    n/a  TIM Edwards      "

## 2017-11-29 NOTE — PROGRESS NOTES
SUBJECTIVE:   Miguelina Soria is a 57 year old female who presents to clinic today for the following health issues:      Pt fell last night and hurt left arm     Joint Pain    Onset: last night; around midnight    Description:   Location: L arm; wrist and forearm  Character: Sharp    Intensity: 10/10    Progression of Symptoms: worse    Accompanying Signs & Symptoms:  Other symptoms: none    History:   Previous similar pain: no       Precipitating factors:   Trauma or overuse: YES--jogging to get out of the way of a vehicle in the parking lot and fell; landed on outstretched arms    Alleviating factors:  Improved by: nothing    Therapies Tried and outcome: has tramadol; tried this and didn't make any difference    R hand dominant.  Pain worsens with movement.  No numbness or tingling in L hand or arm.  No bruising.        Problem list and histories reviewed & adjusted, as indicated.  Additional history: as documented    Current Outpatient Prescriptions   Medication Sig Dispense Refill     PARoxetine (PAXIL) 20 MG tablet TAKE 1 TABLET BY MOUTH EVERY DAY WITH DINNER 90 tablet 0     zolpidem (AMBIEN) 5 MG tablet TAKE 1 TABLET BY MOUTH EVERY DAY AT BEDTIME 30 tablet 0     traMADol (ULTRAM) 50 MG tablet TAKE 2 TABLETS BY MOUTH THREE TIMES DAILY AS NEEDED FOR MODERATE PAIN 180 tablet 0     metoprolol (TOPROL-XL) 25 MG 24 hr tablet Take 1 tablet (25 mg) by mouth daily 90 tablet 3     Omeprazole 20 MG tablet Take 20 mg by mouth daily. 90 tablet 1     Allergies   Allergen Reactions     No Known Drug Allergies        Reviewed and updated as needed this visit by clinical staffTobacco  Allergies  Meds  Problems       Reviewed and updated as needed this visit by Provider  Problems         ROS:  CONSTITUTIONAL:NEGATIVE for fever, chills, change in weight  MUSCULOSKELETAL: POSITIVE  for L hand and arm pain   NEURO: NEGATIVE for numbness, tingling    OBJECTIVE:     BP (!) 150/100 (BP Location: Right arm, Patient Position:  "Chair, Cuff Size: Adult Regular)  Pulse 76  Temp 98.2  F (36.8  C) (Oral)  Resp 16  Ht 5' 5\" (1.651 m)  Wt 154 lb 14.4 oz (70.3 kg)  LMP 01/01/2010  SpO2 98%  Breastfeeding? No  BMI 25.78 kg/m2  Body mass index is 25.78 kg/(m^2).  GENERAL: healthy, alert and no distress  MS: L arm: elbow with FROM, wrist and finger ROM and strength decreased 2/2 pain, mild swelling over the extensor surface of the wrist, 4th and 5th metacarpals and anterior and posterior wrist ttp  SKIN: L arm: skin warm and dry; no discoloration    Diagnostic Test Results:  Xray - negative    ASSESSMENT/PLAN:     1. Pain of left hand  - XR Hand Left G/E 3 Views; Future    2. Need for prophylactic vaccination and inoculation against influenza  Administered today.   - FLU VAC, SPLIT VIRUS IM > 3 YO (QUADRIVALENT) [33671]  - Vaccine Administration, Initial [16410]    3. Sprain of left wrist, initial encounter  Rest, ice, compression wrap and alternate tylenol and ibuprofen as needed for pain control.  Works in a plastic plant.  Note given for work.        F/u if no improvement in 1-2 weeks; sooner if worsening symptoms     CLEVE Mendez [facility]    "

## 2017-11-29 NOTE — MR AVS SNAPSHOT
After Visit Summary   11/29/2017    Miguelina Soria    MRN: [id number]           Patient Information     Date Of Birth          1960        Visit Information        Provider Department      11/29/2017 9:00 AM Dia Padilla APRN CNP [facility]        Today's Diagnoses     Left wrist pain    -  1    Pain of left hand          Care Instructions    Rest, ice, and alternate tylenol and ibuprofen as needed for pain control.  No more than 3000 mg of tylenol in 24 hours.  Pain should be improving over the next 1-2 weeks.  If pain is worsening return for follow up.            Follow-ups after your visit        Follow-up notes from your care team     Return if symptoms worsen or fail to improve.      Who to contact     If you have questions or need follow up information about today's clinic visit or your schedule please contact [facility] directly at [phone].  Normal or non-critical lab and imaging results will be communicated to you by YouOShart, letter or phone within 4 business days after the clinic has received the results. If you do not hear from us within 7 days, please contact the clinic through YouOShart or phone. If you have a critical or abnormal lab result, we will notify you by phone as soon as possible.  Submit refill requests through WeSpire or call your pharmacy and they will forward the refill request to us. Please allow 3 business days for your refill to be completed.          Additional Information About Your Visit        MyChart Information     WeSpire gives you secure access to your electronic health record. If you see a primary care provider, you can also send messages to your care team and make appointments. If you have questions, please call your primary care clinic.  If you do not have a primary care provider, please call [phone] and they will assist you.        Care EveryWhere ID     This is your Care EveryWhere ID. This could be used  "by other organizations to access your [place] medical records  [id number]        Your Vitals Were     Pulse Temperature Respirations Height Last Period Pulse Oximetry    76 98.2  F (36.8  C) (Oral) 16 5' 5\" (1.651 m) 01/01/2010 98%    Breastfeeding? BMI (Body Mass Index)                No 25.78 kg/m2           Blood Pressure from Last 3 Encounters:   11/29/17 (!) 150/100   07/11/17 138/88   04/21/17 130/82    Weight from Last 3 Encounters:   11/29/17 154 lb 14.4 oz (70.3 kg)   07/11/17 154 lb 8 oz (70.1 kg)   04/21/17 150 lb (68 kg)               Primary Care Provider Office Phone # Fax #    Jose A Davila [phone] [facility] [id number]        Equal Access to Services     [facility] : Hadii aad ku hadasho Soomaali, waaxda luqadaha, qaybta kaalmada adeegyada, waxay idiin hayaan adeeg kharash la'aan . So [facility] [phone].    ATENCIÓN: Si habla español, tiene a roberts disposición servicios gratuitos de asistencia lingüística. Mendel al [phone].    We comply with applicable federal civil rights laws and [place] laws. We do not discriminate on the basis of race, color, national origin, age, disability, sex, sexual orientation, or gender identity.            Thank you!     Thank you for choosing [facility]  for your care. Our goal is always to provide you with excellent care. Hearing back from our patients is one way we can continue to improve our services. Please take a few minutes to complete the written survey that you may receive in the mail after your visit with us. Thank you!             Your Updated Medication List - Protect others around you: Learn how to safely use, store and throw away your medicines at www.disposemymeds.org.          This list is accurate as of: 11/29/17 10:58 AM.  Always use your most recent med list.                   Brand Name Dispense Instructions for use Diagnosis    metoprolol 25 MG 24 hr tablet    TOPROL-XL    " 90 tablet    Take 1 tablet (25 mg) by mouth daily    Essential hypertension       omeprazole 20 MG tablet     90 tablet    Take 20 mg by mouth daily.    GERD (gastroesophageal reflux disease)       PARoxetine 20 MG tablet    PAXIL    90 tablet    TAKE 1 TABLET BY MOUTH EVERY DAY WITH DINNER    Mild recurrent major depression (H)       traMADol 50 MG tablet    ULTRAM    180 tablet    TAKE 2 TABLETS BY MOUTH THREE TIMES DAILY AS NEEDED FOR MODERATE PAIN    Chronic pain disorder       zolpidem 5 MG tablet    AMBIEN    30 tablet    TAKE 1 TABLET BY MOUTH EVERY DAY AT BEDTIME    Sleep disorder

## 2017-11-29 NOTE — PATIENT INSTRUCTIONS
Rest, ice, and alternate tylenol and ibuprofen as needed for pain control.  No more than 3000 mg of tylenol in 24 hours.  Pain should be improving over the next 1-2 weeks.  If pain is worsening return for follow up.

## 2017-11-29 NOTE — LETTER
November 29, 2017      Miguelina Soria  [address] [address]        To Whom It May Concern:    Miguelina Soria was seen in our clinic today. Please excuse her from work on 11/29/2017-12/1/2017.        Sincerely,        CLEVE Mendez CNP

## 2017-11-30 DIAGNOSIS — G89.4 CHRONIC PAIN DISORDER: ICD-10-CM

## 2017-11-30 ASSESSMENT — ANXIETY QUESTIONNAIRES: GAD7 TOTAL SCORE: 12

## 2017-11-30 ASSESSMENT — ASTHMA QUESTIONNAIRES: ACT_TOTALSCORE: 25

## 2017-11-30 NOTE — TELEPHONE ENCOUNTER
Refill of Tramadol please call when faxed to the pharmacy  Roxanne Rodriguez/    Last OV:  11/29/2017    Date last filled: 10/10/2017 qty 180 refill 1    Roxanne Rodriguez/

## 2017-12-01 RX ORDER — TRAMADOL HYDROCHLORIDE 50 MG/1
TABLET ORAL
Qty: 180 TABLET | Refills: 0 | Status: SHIPPED | OUTPATIENT
Start: 2017-12-01 | End: 2018-01-12

## 2017-12-04 DIAGNOSIS — G47.9 SLEEP DISORDER: ICD-10-CM

## 2017-12-05 RX ORDER — ZOLPIDEM TARTRATE 5 MG/1
TABLET ORAL
Qty: 30 TABLET | Refills: 1 | Status: SHIPPED | OUTPATIENT
Start: 2017-12-05 | End: 2017-12-31

## 2017-12-05 NOTE — TELEPHONE ENCOUNTER
zolpidem (AMBIEN) 5 MG tablet     Sig: TAKE 1 TABLET BY MOUTH EVERY DAY AT BEDTIME   Last Written Prescription Date:  11/6/17  Last Fill Quantity: 30,   # refills: 0  Last Office Visit with [facility], [facility] or [facility] prescribing provider: 11/29/2017       Routing refill request to provider for review/approval because:  Drug not on the [facility], [facility] or [facility] refill protocol or controlled substance    MELINDA Torrez  December 5, 2017  1:25 PM

## 2017-12-05 NOTE — TELEPHONE ENCOUNTER
Routing refill request to provider to review approval because:  Drug not on the [facility], [facility] or [facility] refill protocol or controlled substance    (last associated diagnosis for sleep from [facility] 4/2017)  Yudith Lujan RN, BSN  Message handled by Nurse Triage.

## 2017-12-18 ENCOUNTER — TELEPHONE (OUTPATIENT)
Dept: FAMILY MEDICINE | Facility: CLINIC | Age: 57
End: 2017-12-18

## 2017-12-18 NOTE — TELEPHONE ENCOUNTER
Panel Management Review      Patient has the following on her problem list:     Depression / Dysthymia review    Measure:  Needs PHQ-9 score of 4 or less during index window.  Administer PHQ-9 and if score is 5 or more, send encounter to provider for next steps.     5 - 7 month window range:     PHQ-9 SCORE 9/6/2016 4/21/2017 11/29/2017   Total Score - - -   Total Score 17 17 10       If PHQ-9 recheck is 5 or more, route to provider for next steps.    Patient is due for:  PHQ9 and DAP        Composite cancer screening  Chart review shows that this patient is due/due soon for the following None  Summary:    Patient is due/failing the following:   BP CHECK and MAMMOGRAM    Action needed:   Patient needs nurse only appointment. For BP Check     Type of outreach:    Phone, left message for patient to call back.     Questions for provider review:    None                                                                                                                                    Sharon Larson      Chart routed to Care Team .

## 2017-12-31 DIAGNOSIS — G47.9 SLEEP DISORDER: ICD-10-CM

## 2018-01-03 RX ORDER — ZOLPIDEM TARTRATE 5 MG/1
TABLET ORAL
Qty: 30 TABLET | Refills: 0 | Status: SHIPPED | OUTPATIENT
Start: 2018-01-03 | End: 2018-01-28

## 2018-01-03 NOTE — TELEPHONE ENCOUNTER
Pt calling to check status of refill.    Geovany Veliz   01/03/18 8:15 AM     zolpidem (AMBIEN) 5 MG tablet      Last Written Prescription Date:  12/5/17  Last Fill Quantity: 30,   # refills: 1  Last Office Visit: 11/29/2017    Future Office visit:    Next 5 appointments (look out 90 days)     Jan 09, 2018 10:15 AM CST   Nurse Only with CR BRONZE MA/LPN   [facility] ([facility])    [address] [address] [phone]

## 2018-01-03 NOTE — TELEPHONE ENCOUNTER
[place] reviewed:  reviewed- no concerns    [place]  [email or web address]

## 2018-01-09 PROBLEM — Z53.9 NO SHOW: Status: ACTIVE | Noted: 2018-01-09

## 2018-01-12 DIAGNOSIS — G89.4 CHRONIC PAIN DISORDER: ICD-10-CM

## 2018-01-12 NOTE — TELEPHONE ENCOUNTER
Controlled Substance Refill Request for TRAMADOL 50MG TABLETS    Problem List Complete:  No     PROVIDER TO CONSIDER COMPLETION OF PROBLEM LIST AND OVERVIEW/CONTROLLED SUBSTANCE AGREEMENT    Last Written Prescription Date:  12/01/17  Last Fill Quantity: 180,   # refills: 0    Last Office Visit with [facility] primary care provider: 07/11/17 Dr Davila    Future Office visit:     Controlled substance agreement on file: No.     Processing:  Fax Rx to [facility] pharmacy     checked in past 6 months?  No, route to RN

## 2018-01-15 RX ORDER — TRAMADOL HYDROCHLORIDE 50 MG/1
100 TABLET ORAL EVERY 8 HOURS PRN
Qty: 180 TABLET | Refills: 0 | Status: SHIPPED | OUTPATIENT
Start: 2018-01-15 | End: 2018-02-23

## 2018-01-15 NOTE — TELEPHONE ENCOUNTER
Last OV: 11.29.17  Last fill: 12.1.17 #180 R 0  : 10.4.17    Routing refill request to provider for review/approval because:  Drug not on the [facility] refill protocol     Patricia Collier RN, BS  Clinical Nurse Triage.

## 2018-01-28 DIAGNOSIS — G47.9 SLEEP DISORDER: ICD-10-CM

## 2018-01-29 RX ORDER — ZOLPIDEM TARTRATE 5 MG/1
TABLET ORAL
Qty: 30 TABLET | Refills: 0 | Status: SHIPPED | OUTPATIENT
Start: 2018-01-29 | End: 2018-03-02

## 2018-01-29 NOTE — TELEPHONE ENCOUNTER
Controlled Substance Refill Request for zolpidem (AMBIEN) 5 MG tablet  Problem List Complete:  No     PROVIDER TO CONSIDER COMPLETION OF PROBLEM LIST AND OVERVIEW/CONTROLLED SUBSTANCE AGREEMENT    Last Written Prescription Date:  01/03/2018  Last Fill Quantity: 30 tablet,   # refills: 0    Last Office Visit with [facility] primary care provider: 01/29/2017    Future Office visit:     Controlled substance agreement on file: No.     Processing:  Fax Rx to [facility] pharmacy   checked in past 6 months?  No, route to KRISTY LAWRENCE

## 2018-01-30 NOTE — TELEPHONE ENCOUNTER
Last OV: 11.29.17  Last RX: 1.3.18    Routing refill request to provider for review/approval because:  Drug not on the [facility] refill protocol     Patricai Collier RN, BS  Clinical Nurse Triage.

## 2018-02-11 DIAGNOSIS — F33.0 MILD RECURRENT MAJOR DEPRESSION (H): ICD-10-CM

## 2018-02-12 ENCOUNTER — OFFICE VISIT (OUTPATIENT)
Dept: FAMILY MEDICINE | Facility: CLINIC | Age: 58
End: 2018-02-12
Payer: COMMERCIAL

## 2018-02-12 VITALS
BODY MASS INDEX: 25.83 KG/M2 | TEMPERATURE: 98.1 F | SYSTOLIC BLOOD PRESSURE: 138 MMHG | RESPIRATION RATE: 14 BRPM | DIASTOLIC BLOOD PRESSURE: 78 MMHG | HEIGHT: 65 IN | WEIGHT: 155 LBS | OXYGEN SATURATION: 98 % | HEART RATE: 73 BPM

## 2018-02-12 DIAGNOSIS — R51.9 SINUS HEADACHE: ICD-10-CM

## 2018-02-12 DIAGNOSIS — K21.00 GASTROESOPHAGEAL REFLUX DISEASE WITH ESOPHAGITIS: ICD-10-CM

## 2018-02-12 DIAGNOSIS — I10 HYPERTENSION GOAL BP (BLOOD PRESSURE) < 140/90: ICD-10-CM

## 2018-02-12 DIAGNOSIS — F33.42 MAJOR DEPRESSIVE DISORDER, RECURRENT EPISODE, IN FULL REMISSION (H): ICD-10-CM

## 2018-02-12 DIAGNOSIS — E78.5 HYPERLIPIDEMIA LDL GOAL <160: ICD-10-CM

## 2018-02-12 DIAGNOSIS — F51.01 PRIMARY INSOMNIA: Primary | ICD-10-CM

## 2018-02-12 DIAGNOSIS — G89.29 OTHER CHRONIC PAIN: ICD-10-CM

## 2018-02-12 LAB
ALBUMIN SERPL-MCNC: 4 G/DL (ref 3.4–5)
ALP SERPL-CCNC: 80 U/L (ref 40–150)
ALT SERPL W P-5'-P-CCNC: 20 U/L (ref 0–50)
ANION GAP SERPL CALCULATED.3IONS-SCNC: 8 MMOL/L (ref 3–14)
AST SERPL W P-5'-P-CCNC: 16 U/L (ref 0–45)
BILIRUB SERPL-MCNC: 0.4 MG/DL (ref 0.2–1.3)
BUN SERPL-MCNC: 11 MG/DL (ref 7–30)
CALCIUM SERPL-MCNC: 9.6 MG/DL (ref 8.5–10.1)
CHLORIDE SERPL-SCNC: 104 MMOL/L (ref 94–109)
CHOLEST SERPL-MCNC: 325 MG/DL
CO2 SERPL-SCNC: 27 MMOL/L (ref 20–32)
CREAT SERPL-MCNC: 0.59 MG/DL (ref 0.52–1.04)
GFR SERPL CREATININE-BSD FRML MDRD: >90 ML/MIN/1.7M2
GLUCOSE SERPL-MCNC: 101 MG/DL (ref 70–99)
HDLC SERPL-MCNC: 61 MG/DL
LDLC SERPL CALC-MCNC: 221 MG/DL
NONHDLC SERPL-MCNC: 264 MG/DL
POTASSIUM SERPL-SCNC: 4.3 MMOL/L (ref 3.4–5.3)
PROT SERPL-MCNC: 7.4 G/DL (ref 6.8–8.8)
SODIUM SERPL-SCNC: 139 MMOL/L (ref 133–144)
TRIGL SERPL-MCNC: 215 MG/DL

## 2018-02-12 PROCEDURE — 36415 COLL VENOUS BLD VENIPUNCTURE: CPT | Performed by: FAMILY MEDICINE

## 2018-02-12 PROCEDURE — 99214 OFFICE O/P EST MOD 30 MIN: CPT | Performed by: FAMILY MEDICINE

## 2018-02-12 PROCEDURE — 80053 COMPREHEN METABOLIC PANEL: CPT | Performed by: FAMILY MEDICINE

## 2018-02-12 PROCEDURE — 80061 LIPID PANEL: CPT | Performed by: FAMILY MEDICINE

## 2018-02-12 RX ORDER — AMOXICILLIN 875 MG
875 TABLET ORAL 2 TIMES DAILY
Qty: 20 TABLET | Refills: 0 | Status: SHIPPED | OUTPATIENT
Start: 2018-02-12 | End: 2018-08-21

## 2018-02-12 ASSESSMENT — PATIENT HEALTH QUESTIONNAIRE - PHQ9
SUM OF ALL RESPONSES TO PHQ QUESTIONS 1-9: 13
10. IF YOU CHECKED OFF ANY PROBLEMS, HOW DIFFICULT HAVE THESE PROBLEMS MADE IT FOR YOU TO DO YOUR WORK, TAKE CARE OF THINGS AT HOME, OR GET ALONG WITH OTHER PEOPLE: SOMEWHAT DIFFICULT
SUM OF ALL RESPONSES TO PHQ QUESTIONS 1-9: 13

## 2018-02-12 NOTE — TELEPHONE ENCOUNTER
"Requested Prescriptions   Pending Prescriptions Disp Refills     PARoxetine (PAXIL) 20 MG tablet [Pharmacy Med Name: PAROXETINE 20MG TABLETS] 90 tablet 0     Sig: TAKE 1 TABLET BY MOUTH EVERY DAY WITH DINNER    SSRIs Protocol Failed    2/12/2018  4:34 PM       Failed - PHQ-9 score less than 5 in past 6 months    The PHQ-9 criteria is meant to fail. It requires a PHQ-9 score review         Passed - Patient is age 18 or older       Passed - No active pregnancy on record       Passed - No positive pregnancy test in last 12 months       Passed - Recent (6 mo) or future visit with authorizing provider's specialty    Patient had office visit in the last 6 months or has a visit in the next 30 days with authorizing provider.  See \"Patient Info\" tab in inbasket, or \"Choose Columns\" in Meds & Orders section of the refill encounter.            Last Written Prescription Date:  11/15/17  Last Fill Quantity: 90,  # refills: 0   Last office visit: 11/29/2017 with prescribing provider:  Dia Padilla   Future Office Visit:            "

## 2018-02-12 NOTE — PROGRESS NOTES
"  SUBJECTIVE:   Miguelina Soria is a 57 year old female who presents to clinic today for the following health issues:  /88 (BP Location: Right arm, Patient Position: Chair, Cuff Size: Adult Large)  Pulse 73  Temp 98.1  F (36.7  C) (Oral)  Resp 14  Ht 5' 5\" (1.651 m)  Wt 155 lb (70.3 kg)  LMP 01/01/2010  SpO2 98%  BMI 25.79 kg/m2      Medication Followup of all medications    Taking Medication as prescribed: yes    Side Effects:  None    Medication Helping Symptoms:  yes     Recent issues with bifrontal headache   Past Medical History:   Diagnosis Date     Depressive disorder, not elsewhere classified      Hyperlipidaemia        History reviewed. No pertinent surgical history.    Family History   Problem Relation Age of Onset     CANCER Maternal Grandmother      DIABETES Maternal Grandmother      CANCER Maternal Grandfather      CANCER Paternal Grandmother      Neurologic Disorder Paternal Grandfather        Social History   Substance Use Topics     Smoking status: Former Smoker     Packs/day: 0.50     Years: 40.00     Types: Cigarettes     Quit date: 8/16/2012     Smokeless tobacco: Never Used     Alcohol use No      Comment: Quit on November 8th, 2007        REVIEW OF SYSTEMS    Generally has been having headache and URI and no  feeling well until this episode. No problems with vision, hearing, dental or neck pain.Has hph airborne or ingestion allergy  No chest pain, palpitations, dyspnea, change in bowel habits, blood  in stool or dyspepsia.  No rashes, changing moles, weakness, lassitude or back problems.  No chronic issues . No dysuria  Patient not  a smoker. No problems with significant headaches.  On exam the vital signs are stable  Weight is Body mass index is 25.79 kg/(m^2).   Eyes show catarina  No neck masses or thyromegaly.Ear nose and throat shows normal   No bruits, murmers, rubs or extrasounds. No cardiomegaly or chest wall tenderness. Lungs clear, no abdominal masses or organomegaly. No CVA " tenderness.  Skin eval no rah  No hernias, good range of motion neck, back and extremities. No abnormal skin lesions. Normal genitalia. Good peripheral pulses. No adenopathy.  Normal gait and stance. Neck is supple.  Back exam shows good rom, low back pain with overuse    Controlled Substance Agreement signed today     (F51.01) Primary insomnia  (primary encounter diagnosis)  Comment:   Current Outpatient Prescriptions   Medication     amoxicillin (AMOXIL) 875 MG tablet     omeprazole (PRILOSEC) 20 MG CR capsule     zolpidem (AMBIEN) 5 MG tablet     traMADol (ULTRAM) 50 MG tablet     PARoxetine (PAXIL) 20 MG tablet     metoprolol (TOPROL-XL) 25 MG 24 hr tablet     Omeprazole 20 MG tablet     No current facility-administered medications for this visit.            (I10) Hypertension goal BP (blood pressure) < 140/90  Comment:   Plan: at goal    (R51) Sinus headache  Comment: new issue, infection  Plan: amoxicillin (AMOXIL) 875 MG tablet            (F33.42) Major depressive disorder, recurrent episode, in full remission (H)  Comment:   Plan: doing very well, socioeconomic stability has helper her     (G89.29) Other chronic pain  Comment:   Plan: thoracic and lumbar     (K21.0) Gastroesophageal reflux disease with esophagitis  Comment:   Plan: omeprazole (PRILOSEC) 20 MG CR capsule            (E78.5) Hyperlipidemia LDL goal <160  Comment:   Plan: elevated LDL, may need meds       Answers for HPI/ROS submitted by the patient on 2/12/2018   Chronic problems general questions HPI Form  Diet:: Regular (no restrictions)  Frequency of exercise:: None  Taking medications regularly:: Yes  Medication side effects:: None  Additional concerns today:: YES  PHQ-2 Score: 4  If you checked off any problems, how difficult have these problems made it for you to do your work, take care of things at home, or get along with other people?: Somewhat difficult  PHQ9 TOTAL SCORE: 13  Headache Symptoms are: Worsening  Migraine frequency:: 5 per  week  Migraine Duration:: >3 hours  Ability to perform ADL's:: Yes  Migraine Rescue/Relief Medications:: None  Effectiveness of rescue/relief medications:: No relief  Migraine Preventative Medications:: None  Neurological symptoms:: None  ER or UC Visits:: None

## 2018-02-12 NOTE — MR AVS SNAPSHOT
After Visit Summary   2/12/2018    Miguelina Soria    MRN: [id number]           Patient Information     Date Of Birth          1960        Visit Information        Provider Department      2/12/2018 11:00 AM Jose A Davila MD [facility]        Today's Diagnoses     Primary insomnia    -  1    Hypertension goal BP (blood pressure) < 140/90        Sinus headache        Major depressive disorder, recurrent episode, in full remission (H)        Other chronic pain        Gastroesophageal reflux disease with esophagitis        Hyperlipidemia LDL goal <160           Follow-ups after your visit        Who to contact     If you have questions or need follow up information about today's clinic visit or your schedule please contact [facility] directly at [phone].  Normal or non-critical lab and imaging results will be communicated to you by MyChart, letter or phone within 4 business days after the clinic has received the results. If you do not hear from us within 7 days, please contact the clinic through [employer] or phone. If you have a critical or abnormal lab result, we will notify you by phone as soon as possible.  Submit refill requests through [employer] or call your pharmacy and they will forward the refill request to us. Please allow 3 business days for your refill to be completed.          Additional Information About Your Visit        MyChart Information     [employer] gives you secure access to your electronic health record. If you see a primary care provider, you can also send messages to your care team and make appointments. If you have questions, please call your primary care clinic.  If you do not have a primary care provider, please call [phone] and they will assist you.        Care EveryWhere ID     This is your Care EveryWhere ID. This could be used by other organizations to access your [place] medical records  [id number]        Your Vitals  "Were     Pulse Temperature Respirations Height Last Period Pulse Oximetry    73 98.1  F (36.7  C) (Oral) 14 5' 5\" (1.651 m) 01/01/2010 98%    BMI (Body Mass Index)                   25.79 kg/m2            Blood Pressure from Last 3 Encounters:   02/12/18 138/78   11/29/17 (!) 150/100   07/11/17 138/88    Weight from Last 3 Encounters:   02/12/18 155 lb (70.3 kg)   11/29/17 154 lb 14.4 oz (70.3 kg)   07/11/17 154 lb 8 oz (70.1 kg)              We Performed the Following     Comprehensive metabolic panel     Lipid panel reflex to direct LDL Fasting          Today's Medication Changes          These changes are accurate as of 2/12/18  2:48 PM.  If you have any questions, ask your nurse or doctor.               Start taking these medicines.        Dose/Directions    amoxicillin 875 MG tablet   Commonly known as:  AMOXIL   Used for:  Sinus headache   Started by:  Jose A Davila MD        Dose:  875 mg   Take 1 tablet (875 mg) by mouth 2 times daily   Quantity:  20 tablet   Refills:  0         These medicines have changed or have updated prescriptions.        Dose/Directions    * omeprazole 20 MG tablet   This may have changed:  Another medication with the same name was added. Make sure you understand how and when to take each.   Used for:  GERD (gastroesophageal reflux disease)   Changed by:  Jose A Davila MD        Dose:  20 mg   Take 20 mg by mouth daily.   Quantity:  90 tablet   Refills:  1       * omeprazole 20 MG CR capsule   Commonly known as:  priLOSEC   This may have changed:  You were already taking a medication with the same name, and this prescription was added. Make sure you understand how and when to take each.   Used for:  Gastroesophageal reflux disease with esophagitis   Changed by:  Jose A Davila MD        Dose:  20 mg   Take 1 capsule (20 mg) by mouth daily   Quantity:  90 capsule   Refills:  3       * Notice:  This list has 2 medication(s) that are the same as other " medications prescribed for you. Read the directions carefully, and ask your doctor or other care provider to review them with you.         Where to get your medicines      These medications were sent to [facility] - [place], [place] - [address] AT [facility] [address]  [address] [place] [address] [address]     Phone:  [phone]     amoxicillin 875 MG tablet    omeprazole 20 MG CR capsule                Primary Care Provider Office Phone # Fax #    Jose A Davila [phone] [address] [facility] [address] [address]        Equal Access to Services     [facility]: Hadii aad ku hadasho Soomaali, waaxda luqadaha, qaybta kaalmada adeegyada, monika henderson haymaia meadows . So [facility] [phone].    ATENCIÓN: Si habla español, tiene a roberts disposición servicios gratuitos de asistencia lingüística. [facility] [phone].    We comply with applicable federal civil rights laws and [place] laws. We do not discriminate on the basis of race, color, national origin, age, disability, sex, sexual orientation, or gender identity.            Thank you!     Thank you for choosing [facility]  for your care. Our goal is always to provide you with excellent care. Hearing back from our patients is one way we can continue to improve our services. Please take a few minutes to complete the written survey that you may receive in the mail after your visit with us. Thank you!             Your Updated Medication List - Protect others around you: Learn how to safely use, store and throw away your medicines at www.disposemymeds.org.          This list is accurate as of 2/12/18  2:48 PM.  Always use your most recent med list.                   Brand Name Dispense Instructions for use Diagnosis    amoxicillin 875 MG tablet    AMOXIL    20 tablet    Take 1 tablet (875 mg) by mouth 2 times daily    Sinus headache       metoprolol succinate 25 MG 24 hr tablet    TOPROL-XL     90 tablet    Take 1 tablet (25 mg) by mouth daily    Essential hypertension       * omeprazole 20 MG tablet     90 tablet    Take 20 mg by mouth daily.    GERD (gastroesophageal reflux disease)       * omeprazole 20 MG CR capsule    priLOSEC    90 capsule    Take 1 capsule (20 mg) by mouth daily    Gastroesophageal reflux disease with esophagitis       PARoxetine 20 MG tablet    PAXIL    90 tablet    TAKE 1 TABLET BY MOUTH EVERY DAY WITH DINNER    Mild recurrent major depression (H)       traMADol 50 MG tablet    ULTRAM    180 tablet    Take 2 tablets (100 mg) by mouth every 8 hours as needed for moderate pain LAST REFILL SEE MD    Chronic pain disorder       zolpidem 5 MG tablet    AMBIEN    30 tablet    TAKE 1 TABLET BY MOUTH NIGHTLY AS NEEDED FOR SLEEP    Sleep disorder       * Notice:  This list has 2 medication(s) that are the same as other medications prescribed for you. Read the directions carefully, and ask your doctor or other care provider to review them with you.

## 2018-02-12 NOTE — LETTER
[facility]    02/12/18    Patient: Miguelina Soria  YOB: 1960  Medical Record Number: [id number]                                                                  Controlled Substance Agreement  I understand that my care provider has prescribed controlled substances (narcotics, tranquilizers, and/or stimulants) to help manage my condition(s).  I am taking this medicine to help me function or work.  I know that this is strong medicine.  It could have serious side effects and even cause a dependency on the drug.  If I stop these medicines suddenly, I could have severe withdrawal symptoms.    The risks, benefits, and side effects of these medication(s) were explained to me.  I agree that:  1. I will take part in other treatments as advised by my provider.  This may be psychiatry or counseling, physical therapy, behavioral therapy, group treatment, or a referral to a pain clinic.  I will reduce or stop my medicine when my provider tells me to do so.   2. I will take my medicines as prescribed.  I will not change the dose or schedule unless my provider tells me to.  There will be no refills if I  run out early.   I may be contacted at any time without warning and asked to complete a drug test or pill count.   3. I will keep all my appointments at the clinic.  If I miss appointments or fail to follow instructions, my provider may stop my medicine.  4. I will not ask other providers to prescribe controlled substances. And I will not accept controlled substances from other people. If I need another prescribed controlled substance for a new reason, I will notify my provider within one business day.  5. If I enroll in the [place] Medical Marijuana program, I will tell my provider.  I will also sign an agreement to share my medical records with my provider.  6. I will use one pharmacy to fill all of my controlled substance prescriptions.  If my prescription is mailed to my pharmacy, it may take  5 to 7 days for my medicine to be ready.  7. I understand that my provider, clinic care team, and pharmacy can track controlled substance prescriptions from other providers through a central database (prescription monitoring program).  8. I will bring in my list of medications (or my medicine bottles) each time I come to the clinic.  REV- 04/2016                                                                                                                                            Page 1 of 2      [facility]    02/12/18    Patient: Miguelina Soria  YOB: 1960  Medical Record Number: [id number]    9. Refills of controlled substances will be made only during office hours.  It is up to me to make sure that I do not run out of my medicines on weekends or holidays.    10. I am responsible for my prescriptions.  If the medicine is lost or stolen, it will not be replaced.   I also agree not to share these medicines with anyone.  11. I agree to not use ANY illegal or recreational drugs.  This includes marijuana, cocaine, bath salts or other drugs.  I agree not to use alcohol unless my provider says I may.  I agree to give urine samples whenever asked.  If I fail to give a urine sample, the provider may stop my medicine.     12. I will tell my nurse or provider right away if I become pregnant or have a new medical problem treated outside of [facility].  13. I understand that this medicine can affect my thinking and judgment.  It may be unsafe for me to drive, use machinery and do dangerous tasks.  I will not do any of these things until I know how the medicine affects me.  If my dose changes, I will wait to see how it affects me.  I will contact my provider if I have concerns about medicine side effects.  I understand that if I do not follow any of the conditions above, my prescriptions or treatment may be stopped.    I agree that my provider, clinic care team, and pharmacy may work with  any city, state or federal law enforcement agency that investigates the misuse, sale, or other diversion of my controlled medicine. I will allow my provider to discuss my care with or share a copy of this agreement with any other treating provider, pharmacy or emergency room where I receive care.  I agree to give up (waive) any right of privacy or confidentiality with respect to these authorizations.   I have read this agreement and have asked questions about anything I did not understand.   ___________________________________    ___________________________  Patient Signature                                                           Date and Time  ___________________________________     ____________________________  Witness                                                                            Date and Time  ___________________________________  Jose A Davila MD  REV-  04/2016                                                                                                                                                                 Page 2 of 2

## 2018-02-13 RX ORDER — PAROXETINE 20 MG/1
TABLET, FILM COATED ORAL
Qty: 90 TABLET | Refills: 1 | Status: SHIPPED | OUTPATIENT
Start: 2018-02-13 | End: 2018-08-21

## 2018-02-13 ASSESSMENT — PATIENT HEALTH QUESTIONNAIRE - PHQ9: SUM OF ALL RESPONSES TO PHQ QUESTIONS 1-9: 13

## 2018-02-13 NOTE — TELEPHONE ENCOUNTER
Routing refill request to provider for review/approval because:  Failed PHQ-9 protocol. Please see below.     Last PHQ-9 was 13 on 2/12/18  Med is pended.     Mar Marcano RN -- [facility]

## 2018-02-23 DIAGNOSIS — G89.29 OTHER CHRONIC PAIN: Primary | ICD-10-CM

## 2018-02-24 NOTE — TELEPHONE ENCOUNTER
Controlled Substance Refill Request for traMADol (ULTRAM) 50 MG tablet  Problem List Complete:  No     PROVIDER TO CONSIDER COMPLETION OF PROBLEM LIST AND OVERVIEW/CONTROLLED SUBSTANCE AGREEMENT    Last Written Prescription Date:  01/15/2018  Last Fill Quantity: 180 tablet,   # refills: 0    Last Office Visit with [facility] primary care provider: 02/12/2018    Future Office visit:     Controlled substance agreement on file: Yes:  Date 05/09/2016.     Processing:  Fax Rx to [facility] pharmacy   checked in past 6 months?  No, route to KRISTY LAWRENCE

## 2018-02-27 RX ORDER — TRAMADOL HYDROCHLORIDE 50 MG/1
TABLET ORAL
Qty: 180 TABLET | Refills: 0 | Status: SHIPPED | OUTPATIENT
Start: 2018-02-27 | End: 2018-04-08

## 2018-02-27 NOTE — TELEPHONE ENCOUNTER
Routing refill request to provider to review approval because:  Drug not on the [facility] or [facility] refill protocol or controlled substance    Yudith Lujan RN, BSN  Message handled by Nurse Triage.

## 2018-02-28 ENCOUNTER — TELEPHONE (OUTPATIENT)
Dept: FAMILY MEDICINE | Facility: CLINIC | Age: 58
End: 2018-02-28

## 2018-02-28 NOTE — TELEPHONE ENCOUNTER
Panel Management Review      Patient has the following on her problem list: None      Composite cancer screening  Chart review shows that this patient is due/due soon for the following None  Summary:    Patient is due/failing the following:   BP CHECK    Action needed:   Patient needs nurse only appointment.    Type of outreach:    patient had a recent office visit and pass her Blood Presure     Questions for provider review:    None                                                                                                                                    Sharon Lrason      Chart routed to none .

## 2018-03-02 DIAGNOSIS — G47.9 SLEEP DISORDER: ICD-10-CM

## 2018-03-02 NOTE — TELEPHONE ENCOUNTER
zolpidem (AMBIEN) 5 MG tablet      Last Written Prescription Date:  1/29/18  Last Fill Quantity: 30,   # refills: 0  Last Office Visit with [facility] or [facility] prescribing provider: 2/12/2018       Routing refill request to provider for review/approval because:  Drug not on the [facility], [facility] or [facility] refill protocol or controlled substance    MELINDA Torrez  March 2, 2018  11:12 AM

## 2018-03-05 RX ORDER — ZOLPIDEM TARTRATE 5 MG/1
TABLET ORAL
Qty: 30 TABLET | Refills: 0 | Status: SHIPPED | OUTPATIENT
Start: 2018-03-05 | End: 2018-04-20

## 2018-03-05 NOTE — TELEPHONE ENCOUNTER
Routing refill request to provider to review approval because:  Drug not on the [facility] [place], [facility] or [facility] refill protocol or controlled substance  Yudith Lujan RN, BSN  Message handled by Nurse Triage.

## 2018-04-08 DIAGNOSIS — G89.29 OTHER CHRONIC PAIN: ICD-10-CM

## 2018-04-09 RX ORDER — TRAMADOL HYDROCHLORIDE 50 MG/1
TABLET ORAL
Qty: 180 TABLET | Refills: 0 | Status: SHIPPED | OUTPATIENT
Start: 2018-04-09 | End: 2018-05-22

## 2018-04-09 NOTE — TELEPHONE ENCOUNTER
Routing refill request to provider for review/approval because:  Drug not on the [facility] refill protocol   : 4.9.18 noted 2 rx's from dentist, violation of contract  10/13/2017 HYDROCODONE-ACETAMIN 5-325 MG 12.00 3 [id number] 10/13/2017 [id number] N [id number] 20.0    07/25/2017 HYDROCODONE-ACETAMIN 5-325 MG 12.00 3 [id number] 07/25/2017 [id number] N [id number] 20.0    Encounter-Level CSA - 02/12/2018:          Controlled Substance Agreement - Scan on 3/27/2018 12:50 PM : CONTROLLED SUBSTANCE AGREEMENT (below)            Encounter-Level CSA - 02/12/2018:          Controlled Substance Agreement - Scan on 5/9/2016  9:52 AM : CONTROLLED SUBSTANCE AGREEMENT (below)                Patricia Collier RN, BS  Clinical Nurse Triage.

## 2018-04-09 NOTE — TELEPHONE ENCOUNTER
traMADol (ULTRAM) 50 MG tablet      Sig: TAKE 2 TABLET BY MOUTH EVERY 8 HOURS AS NEEDED FOR MODERATE PAIN.  Last Written Prescription Date:  2/27/18  Last Fill Quantity: 180,   # refills: 0  [facility] or [facility] prescribing provider: 2/12/2018    Overview Addendum 10/4/2017 11:30 AM by Patricia Collier RN      Patient is followed by MARY ANGELES for ongoing prescription of narcotic pain medicine.  Med: tramadol.   Maximum use per month: 180  Expected duration: longterm  Narcotic agreement on file: YES  Clinic visit recommended: Q 3 months  Patient is followed by MARY ANGELES for ongoing prescription of pain medication.  All refills should be approved by this provider, or covering partner.     Medication(s): tramadol.      Clinic visit frequency required: Q 3 months      Controlled substance agreement on file: Yes       Date(s): 2016     Pain Clinic evaluation in the past: No        Last [facility] website verification:  10.4.17         Routing refill request to provider for review/approval because:  Drug not on the [facility], [facility] or [facility] refill protocol or controlled substance    MELINDA Torrez  April 9, 2018  10:49 AM

## 2018-04-18 ENCOUNTER — TELEPHONE (OUTPATIENT)
Dept: FAMILY MEDICINE | Facility: CLINIC | Age: 58
End: 2018-04-18

## 2018-04-18 NOTE — TELEPHONE ENCOUNTER
Patient calling and states insurance ends this month.  Wondering what to do about her medications.  Unsure how long she will be without insurance.  Discussed if able to get 90 day fills if due before insurance ends.  Also advised to check with her pharmacy now on prices if pays cash to see how much her RX's are or if cheaper options.  Patient agrees with plan.  Sarita Elliott RN

## 2018-04-20 DIAGNOSIS — G47.9 SLEEP DISORDER: ICD-10-CM

## 2018-04-20 NOTE — TELEPHONE ENCOUNTER
zolpidem (AMBIEN) 5 MG tablet      Last Written Prescription Date:  3/5/2018  Last Fill Quantity: 30,   # refills: 0  Last Office Visit: 2/12/2018  Future Office visit:       Routing refill request to provider for review/approval because:  Drug not on the [facility], [facility] or [facility] refill protocol or controlled substance

## 2018-04-21 RX ORDER — ZOLPIDEM TARTRATE 5 MG/1
TABLET ORAL
Qty: 30 TABLET | Refills: 0 | Status: SHIPPED | OUTPATIENT
Start: 2018-04-21 | End: 2018-06-25

## 2018-04-21 NOTE — TELEPHONE ENCOUNTER
Rx was faxed to [facility], pharmacy will notify patient when ready to be picked up.     Geovany Veliz   04/21/18 11:30 AM

## 2018-05-22 DIAGNOSIS — G89.29 OTHER CHRONIC PAIN: ICD-10-CM

## 2018-05-22 NOTE — TELEPHONE ENCOUNTER
Requested Prescriptions   Pending Prescriptions Disp Refills     traMADol (ULTRAM) 50 MG tablet [Pharmacy Med Name: TRAMADOL 50MG TABLETS] 180 tablet 0     Sig: TAKE 2 TABLETS EVERY 8 HOURS AS NEEDED FOR MODERATE PAIN.    There is no refill protocol information for this order            Last Written Prescription Date:  4/09/18  Last Fill Quantity: 180 tablet,   # refills: 0  Last Office Visit: 2/12/2018 (Lola)  Future Office visit:       Routing refill request to provider for review/approval because:  Drug not on the [facility], [facility] or [facility] refill protocol or controlled substance

## 2018-05-23 NOTE — TELEPHONE ENCOUNTER
Routing refill request to provider for review/approval because:  Drug not on the [facility] refill protocol

## 2018-05-24 RX ORDER — TRAMADOL HYDROCHLORIDE 50 MG/1
TABLET ORAL
Qty: 180 TABLET | Refills: 0 | Status: SHIPPED | OUTPATIENT
Start: 2018-05-24 | End: 2018-07-20

## 2018-06-24 DIAGNOSIS — I10 ESSENTIAL HYPERTENSION: ICD-10-CM

## 2018-06-24 NOTE — TELEPHONE ENCOUNTER
"Requested Prescriptions   Pending Prescriptions Disp Refills     metoprolol succinate (TOPROL-XL) 25 MG 24 hr tablet [Pharmacy Med Name: METOPROLOL ER SUCCINATE 25MG TABS]  Last Written Prescription Date:  7/11/2017  Last Fill Quantity: 90 tablet,  # refills: 3   Last office visit: 2/12/2018 with prescribing provider:  Lola   Future Office Visit:     90 tablet 0     Sig: TAKE 1 TABLET BY MOUTH DAILY    Beta-Blockers Protocol Passed    6/24/2018  7:14 AM       Passed - Blood pressure under 140/90 in past 12 months    BP Readings from Last 3 Encounters:   02/12/18 138/78   11/29/17 (!) 150/100   07/11/17 138/88                Passed - Patient is age 6 or older       Passed - Recent (12 mo) or future (30 days) visit within the authorizing provider's specialty    Patient had office visit in the last 12 months or has a visit in the next 30 days with authorizing provider or within the authorizing provider's specialty.  See \"Patient Info\" tab in inbasket, or \"Choose Columns\" in Meds & Orders section of the refill encounter.              "

## 2018-06-25 DIAGNOSIS — G47.9 SLEEP DISORDER: ICD-10-CM

## 2018-06-25 NOTE — TELEPHONE ENCOUNTER
Controlled Substance Refill Request for zolpidem (AMBIEN) 5 MG tablet  Problem List Complete:  No     PROVIDER TO CONSIDER COMPLETION OF PROBLEM LIST AND OVERVIEW/CONTROLLED SUBSTANCE AGREEMENT    Last Written Prescription Date:  4/21/2018  Last Fill Quantity: 30 tablet,   # refills: 0    [place] [facility] [place] [facility] [place] provider: 2/12/2018Lola    Future Office visit:     Controlled substance agreement on file: Yes:  Date 2/12/2018.     Processing:  Staff will hand deliver Rx to on-site pharmacy   checked in past 3 months?  Yes 4/9/2018     Last [facility] website verification:  done on 4.9.18   [email or web address]

## 2018-06-26 RX ORDER — METOPROLOL SUCCINATE 25 MG/1
TABLET, EXTENDED RELEASE ORAL
Qty: 90 TABLET | Refills: 1 | Status: SHIPPED | OUTPATIENT
Start: 2018-06-26 | End: 2018-08-21

## 2018-06-26 RX ORDER — ZOLPIDEM TARTRATE 5 MG/1
TABLET ORAL
Qty: 30 TABLET | Refills: 0 | Status: SHIPPED | OUTPATIENT
Start: 2018-06-26 | End: 2018-07-20

## 2018-06-26 NOTE — TELEPHONE ENCOUNTER
Prescription approved per [facility] Refill Protocol.  Yudith Lujan RN, BSN

## 2018-06-27 NOTE — TELEPHONE ENCOUNTER
Rx faxed to:    [facility] [address] [place], [place] - [address] AT [place] [address]

## 2018-07-20 DIAGNOSIS — G89.29 OTHER CHRONIC PAIN: ICD-10-CM

## 2018-07-20 DIAGNOSIS — G47.9 SLEEP DISORDER: ICD-10-CM

## 2018-07-21 NOTE — TELEPHONE ENCOUNTER
Requested Prescriptions   Pending Prescriptions Disp Refills     zolpidem (AMBIEN) 5 MG tablet [Pharmacy Med Name: ZOLPIDEM 5MG TABLETS] 30 tablet 0     Sig: TAKE 1 TABLET BY MOUTH EVERY NIGHT AT BEDTIME    There is no refill protocol information for this order       zolpidem (AMBIEN) 5 MG tablet      Last Written Prescription Date:  6/26/18  Last Fill Quantity: 30 tablet,   # refills: 0  Last Office Visit: 2/12/18 (Lola)  Future Office visit:       Routing refill request to provider for review/approval because:  Drug not on the [facility] or  [facility] refill protocol or controlled substance           traMADol (ULTRAM) 50 MG tablet [Pharmacy Med Name: TRAMADOL 50MG TABLETS] 180 tablet 0     Sig: TAKE 2 TABLETS BY MOUTH EVERY 8 HOURS AS NEEDED FOR MODERATE PAIN.    There is no refill protocol information for this order       traMADol (ULTRAM) 50 MG tablet      Last Written Prescription Date:  5/24/18  Last Fill Quantity: 180 tablet,   # refills: 0  Last Office Visit: 2/12/18 (Lola)  Future Office visit:       Routing refill request to provider for review/approval because:  Drug not on the [facility], [facility] or  [facility] refill protocol or controlled substance

## 2018-07-23 RX ORDER — TRAMADOL HYDROCHLORIDE 50 MG/1
100 TABLET ORAL 3 TIMES DAILY PRN
Qty: 180 TABLET | Refills: 0 | Status: SHIPPED | OUTPATIENT
Start: 2018-07-23 | End: 2018-08-21

## 2018-07-23 RX ORDER — ZOLPIDEM TARTRATE 5 MG/1
5 TABLET ORAL AT BEDTIME
Qty: 30 TABLET | Refills: 0 | Status: SHIPPED | OUTPATIENT
Start: 2018-07-23 | End: 2018-08-21

## 2018-07-23 NOTE — TELEPHONE ENCOUNTER
Routing refill request to provider to review approval because:  Drug not on the [facility], [facility] or [facility] refill protocol or controlled substance  : 4/9/18, due for 6 month visit Matteo Lujan RN, BSN  Message handled by Nurse Triage.

## 2018-07-23 NOTE — TELEPHONE ENCOUNTER
Let her know that she'll need appt before next refill and that new rules will require 3 month visits.

## 2018-07-26 ENCOUNTER — TELEPHONE (OUTPATIENT)
Dept: FAMILY MEDICINE | Facility: CLINIC | Age: 58
End: 2018-07-26

## 2018-07-26 NOTE — TELEPHONE ENCOUNTER
rx's were faxed 7/23, see earlier note, pt needs August visit, is pt saying rx's not at pharmacy?  Yudith Lujan, RN, BSN  Message handled by Nurse Triage.

## 2018-07-26 NOTE — TELEPHONE ENCOUNTER
Routed to TC, can you refax 2 prescriptions? Confirm with pt  Yudith Lujan RN, BSN  Message handled by Nurse Triage.

## 2018-07-26 NOTE — TELEPHONE ENCOUNTER
Reason for Call:  Other prescription    Detailed comments: patient is wondering when Dr Davila will get her new Rx's sent over to her pharmacy for her. Please review     Phone Number Patient can be reached at: Home number on file [phone] (home)    Best Time: Any     Can we leave a detailed message on this number? YES    Call taken on 7/26/2018 at 10:33 AM by Roma Kwon

## 2018-07-27 DIAGNOSIS — G47.9 SLEEP DISORDER: ICD-10-CM

## 2018-07-27 DIAGNOSIS — G89.29 OTHER CHRONIC PAIN: ICD-10-CM

## 2018-07-27 RX ORDER — TRAMADOL HYDROCHLORIDE 50 MG/1
TABLET ORAL
Qty: 180 TABLET | Refills: 0 | OUTPATIENT
Start: 2018-07-27

## 2018-07-27 RX ORDER — ZOLPIDEM TARTRATE 5 MG/1
TABLET ORAL
Qty: 30 TABLET | Refills: 0 | OUTPATIENT
Start: 2018-07-27

## 2018-07-27 NOTE — TELEPHONE ENCOUNTER
Tramadol Rx:  Sent on 7/23/2018     Zolpidem  Rx:  Sent on 7/23/2018      CONFIRMED WITH PHARMACY, PATIENT PICKED UP PRESCRIPTION.    Juli HUMMEL RN, BSN, PHN  Dominique Gaytan RN

## 2018-08-21 ENCOUNTER — PATIENT OUTREACH (OUTPATIENT)
Dept: CARE COORDINATION | Facility: CLINIC | Age: 58
End: 2018-08-21

## 2018-08-21 ENCOUNTER — OFFICE VISIT (OUTPATIENT)
Dept: FAMILY MEDICINE | Facility: CLINIC | Age: 58
End: 2018-08-21

## 2018-08-21 VITALS
DIASTOLIC BLOOD PRESSURE: 90 MMHG | TEMPERATURE: 98.6 F | RESPIRATION RATE: 18 BRPM | SYSTOLIC BLOOD PRESSURE: 170 MMHG | HEIGHT: 65 IN | HEART RATE: 76 BPM | BODY MASS INDEX: 26.33 KG/M2 | WEIGHT: 158 LBS

## 2018-08-21 DIAGNOSIS — L71.9 ROSACEA: ICD-10-CM

## 2018-08-21 DIAGNOSIS — G89.29 OTHER CHRONIC PAIN: ICD-10-CM

## 2018-08-21 DIAGNOSIS — Z59.71 DOES NOT HAVE HEALTH INSURANCE: Primary | ICD-10-CM

## 2018-08-21 DIAGNOSIS — G47.9 SLEEP DISORDER: ICD-10-CM

## 2018-08-21 DIAGNOSIS — F33.41 RECURRENT MAJOR DEPRESSIVE DISORDER, IN PARTIAL REMISSION (H): ICD-10-CM

## 2018-08-21 DIAGNOSIS — I10 ESSENTIAL HYPERTENSION: ICD-10-CM

## 2018-08-21 DIAGNOSIS — F33.0 MILD RECURRENT MAJOR DEPRESSION (H): ICD-10-CM

## 2018-08-21 PROCEDURE — 99213 OFFICE O/P EST LOW 20 MIN: CPT | Performed by: FAMILY MEDICINE

## 2018-08-21 RX ORDER — PAROXETINE 20 MG/1
TABLET, FILM COATED ORAL
Qty: 90 TABLET | Refills: 0 | OUTPATIENT
Start: 2018-08-21

## 2018-08-21 RX ORDER — ZOLPIDEM TARTRATE 5 MG/1
5 TABLET ORAL AT BEDTIME
Qty: 30 TABLET | Refills: 0 | Status: SHIPPED | OUTPATIENT
Start: 2018-08-21 | End: 2018-09-17

## 2018-08-21 RX ORDER — TRAMADOL HYDROCHLORIDE 50 MG/1
100 TABLET ORAL 3 TIMES DAILY PRN
Qty: 180 TABLET | Refills: 0 | Status: SHIPPED | OUTPATIENT
Start: 2018-08-21 | End: 2018-09-17

## 2018-08-21 RX ORDER — METRONIDAZOLE 7.5 MG/G
GEL TOPICAL 2 TIMES DAILY
Qty: 45 G | Refills: 1 | Status: SHIPPED | OUTPATIENT
Start: 2018-08-21 | End: 2019-08-05

## 2018-08-21 RX ORDER — METOPROLOL SUCCINATE 50 MG/1
50 TABLET, EXTENDED RELEASE ORAL DAILY
Qty: 90 TABLET | Refills: 0 | Status: SHIPPED | OUTPATIENT
Start: 2018-08-21 | End: 2018-11-15

## 2018-08-21 RX ORDER — PAROXETINE 20 MG/1
TABLET, FILM COATED ORAL
Qty: 90 TABLET | Refills: 0 | Status: SHIPPED | OUTPATIENT
Start: 2018-08-21 | End: 2018-11-17

## 2018-08-21 ASSESSMENT — ANXIETY QUESTIONNAIRES
6. BECOMING EASILY ANNOYED OR IRRITABLE: MORE THAN HALF THE DAYS
2. NOT BEING ABLE TO STOP OR CONTROL WORRYING: MORE THAN HALF THE DAYS
GAD7 TOTAL SCORE: 12
7. FEELING AFRAID AS IF SOMETHING AWFUL MIGHT HAPPEN: SEVERAL DAYS
7. FEELING AFRAID AS IF SOMETHING AWFUL MIGHT HAPPEN: SEVERAL DAYS
3. WORRYING TOO MUCH ABOUT DIFFERENT THINGS: MORE THAN HALF THE DAYS
1. FEELING NERVOUS, ANXIOUS, OR ON EDGE: MORE THAN HALF THE DAYS
4. TROUBLE RELAXING: MORE THAN HALF THE DAYS
GAD7 TOTAL SCORE: 12
5. BEING SO RESTLESS THAT IT IS HARD TO SIT STILL: SEVERAL DAYS
GAD7 TOTAL SCORE: 12

## 2018-08-21 ASSESSMENT — PATIENT HEALTH QUESTIONNAIRE - PHQ9
SUM OF ALL RESPONSES TO PHQ QUESTIONS 1-9: 7
SUM OF ALL RESPONSES TO PHQ QUESTIONS 1-9: 7

## 2018-08-21 NOTE — PROGRESS NOTES
"  SUBJECTIVE:   Miguelina Soria is a 58 year old female who presents to clinic today for the following health issues:    HPI  Does not have health insurance  (primary encounter diagnosis) she can afford no tests few medicines  Sleep disorder on zolpidem  Other chronic pain on tramadol  Mild recurrent major depression (H) on paroxetine   PHQ-9 SCORE 11/29/2017 2/12/2018 8/21/2018   Total Score - - -   Total Score MyChart - 13 (Moderate depression) 7 (Mild depression)   Total Score 10 13 7      Essential hypertension   BP Readings from Last 6 Encounters:   08/21/18 170/90   02/12/18 138/78   11/29/17 (!) 150/100   07/11/17 138/88   04/21/17 130/82   09/06/16 134/84       Rosacea rash about her eyes forehead occasionally uncomfortable or itching    Perhaps 6 weeks.  No therapies tried    ROS no systemic symptoms,   /90 (BP Location: Right arm, Patient Position: Chair, Cuff Size: Adult Regular)  Pulse 76  Temp 98.6  F (37  C) (Oral)  Resp 18  Ht 5' 5\" (1.651 m)  Wt 158 lb (71.7 kg)  LMP 01/01/2010  Breastfeeding? No  BMI 26.29 kg/m2  Rash is erythematous macules conjunctivae unremarkable    ASSESSMENT / PLAN:  (Z59.8) Does not have health insurance  (primary encounter diagnosis)  Comment: Discussed programs for cancer screening medical assistance  Plan: CARE COORDINATION REFERRAL        To assist    (G47.9) Sleep disorder  Comment: Refill  Plan: zolpidem (AMBIEN) 5 MG tablet           (G89.29) Other chronic pain  Comment: Refill  Plan: traMADol (ULTRAM) 50 MG tablet            (F33.0) Mild recurrent major depression (H)  Comment: Now in  partial remission  Plan: PARoxetine (PAXIL) 20 MG tablet            (I10) Essential hypertension  Comment: Not controlled  Plan: metoprolol succinate (TOPROL-XL) 50 MG 24 hr         tablet       Increase    (L71.9) Rosacea  Comment: Discussed  Plan: metroNIDAZOLE (METROGEL) 0.75 % topical gel                  Julian Haskins MD        "

## 2018-08-21 NOTE — PROGRESS NOTES
SUBJECTIVE:   Miguelina Soria is a 58 year old female who presents to clinic today for the following health issues:  Answers for HPI/ROS submitted by the patient on 8/21/2018   PHQ9 TOTAL SCORE: 7  ORTIZ 7 TOTAL SCORE: 12      HPI  Julian Haskins

## 2018-08-21 NOTE — PROGRESS NOTES
Clinic Care Coordination Contact  [facility]/Voicemail     Clinical Data: Order received from Dr. Julian Haskins for Care Coordination Services. Patient is uninsured and is due for a PAP.   Outreach attempted x 2.  No voicemail left as messaging system was nondescript.   Plan: Care Coordinator will try to reach patient again in 1-2 business days.    JESUS Mcconnell   Clinic Care Coordinator  [phone]  [email or web address]

## 2018-08-21 NOTE — LETTER
[facility] CARE COORDINATION  [address]  [facility] [address]      August 23, 2018    Miguelina Soria  [address] [place] [address]      Dear Miguelina,    I am a clinic care coordinator who works with Jose A Davila MD. I have been trying to reach you recently to introduce Clinic Care Coordination and to see if there was anything I could assist you with.  I wanted to introduce myself and provide you with my contact information so that you can call me with questions or concerns about your health care. Below is a description of clinic care coordination and how I can further assist you.     The clinic care coordinator is a registered nurse and/or  who understand the health care system. The goal of clinic care coordination is to help you manage your health and improve access to the [facility] system in the most efficient manner. The registered nurse can assist you in meeting your health care goals by providing education, coordinating services, and strengthening the communication among your providers. The  can assist you with financial, behavioral, psychosocial, chemical dependency, counseling, and/or psychiatric resources.    Please feel free to contact me at [phone], with any questions or concerns. We at [facility] are focused on providing you with the highest-quality healthcare experience possible and that all starts with you.     Sincerely,     JESUS Mcconnell Clinic Care Coordinator  [phone]  [email or web address]    Enclosed: I have enclosed helpful educational material. Please review and call me with any questions.

## 2018-08-21 NOTE — TELEPHONE ENCOUNTER
"Last Written Prescription Date:  8/21/18  Last Fill Quantity: 90 tablet,  # refills: 0   Last office visit: No previous visit found with prescribing provider:  8/21/18 ([facility])   Future Office Visit:      Requested Prescriptions   Pending Prescriptions Disp Refills     PARoxetine (PAXIL) 20 MG tablet [Pharmacy Med Name: PAROXETINE 20MG TABLETS] 90 tablet 0     Sig: TAKE 1 TABLET BY MOUTH EVERY DAY WITH DINNER    SSRIs Protocol Failed    8/21/2018  2:16 PM       Failed - PHQ-9 score less than 5 in past 6 months    Please review last PHQ-9 score.          Passed - Patient is age 18 or older       Passed - No active pregnancy on record       Passed - No positive pregnancy test in last 12 months       Passed - Recent (6 mo) or future (30 days) visit within the authorizing provider's specialty    Patient had office visit in the last 6 months or has a visit in the next 30 days with authorizing provider or within the authorizing provider's specialty.  See \"Patient Info\" tab in inbasket, or \"Choose Columns\" in Meds & Orders section of the refill encounter.              PHQ-9 SCORE 11/29/2017 2/12/2018 8/21/2018   Total Score - - -   Total Score MyChart - 13 (Moderate depression) 7 (Mild depression)   Total Score 10 13 7       ORTIZ-7 SCORE 4/21/2017 11/29/2017 8/21/2018   Total Score - - -   Total Score - - 12 (moderate anxiety)   Total Score 16 12 12         "

## 2018-08-22 ASSESSMENT — ANXIETY QUESTIONNAIRES: GAD7 TOTAL SCORE: 12

## 2018-08-22 ASSESSMENT — PATIENT HEALTH QUESTIONNAIRE - PHQ9: SUM OF ALL RESPONSES TO PHQ QUESTIONS 1-9: 7

## 2018-08-22 ASSESSMENT — ASTHMA QUESTIONNAIRES: ACT_TOTALSCORE: 17

## 2018-08-23 NOTE — PROGRESS NOTES
Clinic Care Coordination Contact  [facility]/Voicemail    Clinical Data: Order received from Dr. Julian Haskins for Care Coordination Services. Patient is uninsured and is due for a PAP.   Outreach attempted x 1.  Left message on voicemail with call back information and requested return call.  Plan: Care Coordinator will mail out care coordination introduction letter with care coordinator contact information, explanation of care coordination services, and request to call [facility] to assist patient with resources.     JESUS Mcconnell   Clinic Care Coordinator  [phone]  [email or web address]

## 2018-08-31 ENCOUNTER — PATIENT OUTREACH (OUTPATIENT)
Dept: CARE COORDINATION | Facility: CLINIC | Age: 58
End: 2018-08-31

## 2018-08-31 NOTE — PROGRESS NOTES
Clinic Care Coordination Contact    Voicemail received from patient stated that she received my Care Coordination Introduction Letter and requested a return call.     [facility] called patient to follow-up regarding PCP's order to assist patient with insurance. Patient reported still being without health insurance. Discussed available community resources. Patient agreed for [facility] to mail patient the Community Resource Guide for Children and Families in [facility] and information on where to get a free PAP smear. Patient also expressed concerns regarding payment of medications. Provided GoodRx.com information and encouraged her to utilize resource to determine where to obtain medications for a significantly lower cost. Patient expressed appreciation.    Patient denies any additional questions or concerns at this times. [facility] engaged in [employer] communication during encounter.    JESUS Mcconnell   Clinic Care Coordinator  [phone]  [email or web address]

## 2018-09-04 ENCOUNTER — TELEPHONE (OUTPATIENT)
Dept: FAMILY MEDICINE | Facility: CLINIC | Age: 58
End: 2018-09-04

## 2018-09-04 NOTE — TELEPHONE ENCOUNTER
Type of outreach:  Phone, left message for patient to call back.      Juliet Diez, CMA on 9/4/2018 at 2:08 PM

## 2018-09-04 NOTE — LETTER
[address] [id number] [phone]  September 11, 2018    Miguelina Soria  [facility] [address] [facility] [place] [address]    Dear Miguelina,    I care about your health and have reviewed your health plan. I have reviewed your medical conditions, medication list, and lab results and am making recommendations based on this review, to better manage your health.    You are in particular need of attention regarding:  -Depression  -Cervical Cancer Screening    I am recommending that you:  -schedule a WELLNESS (Physical) APPOINTMENT with me.   I will check fasting labs the same day - nothing to eat except water and meds for 8-10 hours prior.      Please call us at [phone] (or use [employer]) to address the above recommendations.     Thank you for trusting [facility] and we appreciate the opportunity to serve you.  We look forward to supporting your healthcare needs in the future.    Healthy Regards,    Jose A Davila MD

## 2018-09-04 NOTE — TELEPHONE ENCOUNTER
Panel Management Review      Patient has the following on her problem list:     Depression / Dysthymia review    Measure:  Needs PHQ-9 score of 4 or less during index window.  Administer PHQ-9 and if score is 5 or more, send encounter to provider for next steps.    5 - 7 month window range: 6/12-10/12    PHQ-9 SCORE 11/29/2017 2/12/2018 8/21/2018   Total Score - - -   Total Score MyChart - 13 (Moderate depression) 7 (Mild depression)   Total Score 10 13 7       If PHQ-9 recheck is 5 or more, route to provider for next steps.    Patient is due for:  PHQ9      Composite cancer screening  Chart review shows that this patient is due/due soon for the following Pap Smear  Summary:    Patient is due/failing the following:   PAP and PHQ9    Action needed:   Patient needs office visit for pap. and Patient needs to do PHQ9.    Type of outreach:    routed to panel pool    Questions for provider review:    None                                                                                                                                    TIM Dewitt       Chart routed to Care Team .

## 2018-09-11 NOTE — TELEPHONE ENCOUNTER
Type of outreach:  Phone, left message for patient to call back.  and Sent letter.     Juliet Diez CMA on 9/11/2018 at 9:38 AM

## 2018-09-17 DIAGNOSIS — G89.29 OTHER CHRONIC PAIN: ICD-10-CM

## 2018-09-17 DIAGNOSIS — G47.9 SLEEP DISORDER: ICD-10-CM

## 2018-09-17 NOTE — TELEPHONE ENCOUNTER
Controlled Substance Refill Request for zolpidem (AMBIEN) 5 MG tablet  Problem List Complete:  No     PROVIDER TO CONSIDER COMPLETION OF PROBLEM LIST AND OVERVIEW/CONTROLLED SUBSTANCE AGREEMENT    Last Written Prescription Date:  8/21/2018  Last Fill Quantity: 30 tablet,   # refills: 0    Last Office Visit with [facility] primary care provider: 8/21/2018, [facility]    Controlled substance agreement on file: Yes:  Date 2/12/2018.     Processing:  Staff will hand deliver Rx to on-site pharmacy   checked in past 3 months?  No, route to RN     Last [facility] website verification:  done on 4.9.18   [email or web address]        Controlled Substance Refill Request for traMADol (ULTRAM) 50 MG tablet  Problem List Complete:  No     PROVIDER TO CONSIDER COMPLETION OF PROBLEM LIST AND OVERVIEW/CONTROLLED SUBSTANCE AGREEMENT    Last Written Prescription Date:  8/21/2018  Last Fill Quantity: 180 tablet,   # refills: 0    Last Office Visit with [facility] primary care provider: 8/21/2018, Divine    Controlled substance agreement on file: Yes:  Date 2/12/2108.     Processing:  Staff will hand deliver Rx to on-site pharmacy   checked in past 3 months?  No, route to RN     Last [facility] website verification:  done on 4.9.18   [email or web address]

## 2018-09-18 RX ORDER — TRAMADOL HYDROCHLORIDE 50 MG/1
TABLET ORAL
Qty: 180 TABLET | Refills: 0 | Status: SHIPPED | OUTPATIENT
Start: 2018-09-18 | End: 2018-10-19

## 2018-09-18 RX ORDER — ZOLPIDEM TARTRATE 5 MG/1
TABLET ORAL
Qty: 30 TABLET | Refills: 0 | Status: SHIPPED | OUTPATIENT
Start: 2018-09-18 | End: 2018-10-15

## 2018-09-18 NOTE — TELEPHONE ENCOUNTER
RN not able to check  as I do not have access to providers account.     Mar Marcano, RN -- [facility]

## 2018-10-11 ENCOUNTER — PATIENT OUTREACH (OUTPATIENT)
Dept: CARE COORDINATION | Facility: CLINIC | Age: 58
End: 2018-10-11

## 2018-10-11 NOTE — PROGRESS NOTES
Clinic Care Coordination Contact    Situation: Patient chart reviewed by care coordinator.    Background: Referral received from patient's PCP requesting care coordination to assist with insurance and financial concerns. Multiple attempts had been made to reach patient at listed contact numbers but was unsuccessful. Unable to Contact letter mailed to patient 8/23/18. Patient outreached to [facility] on 8/31/18. Please review [facility] Patient Outreach note 8/31/18 for more information.     Assessment/Plan: Attempts have been made by PCP office to have patient follow-up for a PAP and PHQ9. Office has been unsuccessful at reaching patient and have mailed her an Unable to Contact letter. Writer has been assisting with coverage for [facility] in the interim of onboarding new [facility]. At this time, writer is closing patient and providing a warm handoff for new [facility] to further assist patient with insurance and financial resources.     JESUS Mcconnell  [facility] Care Coordinator  [phone]  [email or web address]

## 2018-10-15 DIAGNOSIS — G47.9 SLEEP DISORDER: ICD-10-CM

## 2018-10-15 NOTE — TELEPHONE ENCOUNTER
Requested Prescriptions   Pending Prescriptions Disp Refills     zolpidem (AMBIEN) 5 MG tablet [Pharmacy Med Name: ZOLPIDEM 5MG TABLETS] 30 tablet 0     Sig: TAKE 1 TABLET BY MOUTH EVERY DAY AT BEDTIME    There is no refill protocol information for this order        Last Written Prescription Date:  9/18/18  Last Fill Quantity: 30,  # refills: 0   Last Office Visit: 8/21/2018  Divine     Visit Disposition   Disposition   Return in about 6 weeks (around 10/2/2018) for Physical Exam.       Future Office Visit:         Routing refill request to provider for review/approval because:  Drug not on the [facility], [facility] or [facility] refill protocol or controlled substance

## 2018-10-16 RX ORDER — ZOLPIDEM TARTRATE 5 MG/1
TABLET ORAL
Qty: 30 TABLET | Refills: 0 | Status: SHIPPED | OUTPATIENT
Start: 2018-10-16 | End: 2018-11-15

## 2018-10-16 NOTE — TELEPHONE ENCOUNTER
Routing refill request to provider to review approval because:  Drug not on the [facility], [facility] or [facility] refill protocol or controlled substance    Yudith Lujan RN, BSN  Message handled by Nurse Triage.

## 2018-10-19 DIAGNOSIS — G89.29 OTHER CHRONIC PAIN: ICD-10-CM

## 2018-10-19 RX ORDER — TRAMADOL HYDROCHLORIDE 50 MG/1
TABLET ORAL
Qty: 180 TABLET | Refills: 0 | Status: SHIPPED | OUTPATIENT
Start: 2018-10-19 | End: 2018-10-29

## 2018-10-19 NOTE — TELEPHONE ENCOUNTER
Last Written Prescription Date:  9.21.18  Last Fill Quantity: 180,  # refills: 0   Last office visit: 8/21/2018 with prescribing provider:  Kai Office Visit:      Requested Prescriptions   Pending Prescriptions Disp Refills     traMADol (ULTRAM) 50 MG tablet [Pharmacy Med Name: TRAMADOL 50MG TABLETS] 180 tablet 0     Sig: TAKE 2 TABLETS BY MOUTH THREE TIMES DAILY AS NEEDED FOR SEVERE PAIN    There is no refill protocol information for this order        Routing refill request to provider for review/approval because:  Drug not on the [facility] refill protocol   : 10.19.18  Annual Controlled substance agreement: 2.12.18  Annual drug screening: last done 7.11.17    Jamee Collier RN

## 2018-10-23 DIAGNOSIS — G89.29 OTHER CHRONIC PAIN: ICD-10-CM

## 2018-10-25 RX ORDER — TRAMADOL HYDROCHLORIDE 50 MG/1
TABLET ORAL
Qty: 180 TABLET | Refills: 0 | OUTPATIENT
Start: 2018-10-25

## 2018-10-29 DIAGNOSIS — G89.29 OTHER CHRONIC PAIN: ICD-10-CM

## 2018-10-30 NOTE — TELEPHONE ENCOUNTER
Controlled Substance Refill Request for traMADol (ULTRAM) 50 MG tablet  Problem List Complete:  Yes    Overview Addendum 10/19/2018  2:20 PM by Patricia Collier RN         Patient is followed by MARY ANGELES for ongoing prescription of pain medication.  All refills should be approved by this provider, or covering partner.     Medication(s): traMADol (ULTRAM) 50 MG tablet      Maximum use per month: 180  Expected duration: longterm  Narcotic agreement on file: YES  Clinic visit recommended: Q 3 months  Clinic visit frequency required: Q 3 months      Controlled substance agreement on file: Yes       Date(s): 2016     Pain Clinic evaluation in the past: No        Controlled substance agreement:  Encounter-Level CSA - 02/12/2018:            Controlled Substance Agreement - Scan on 3/27/2018 12:50 PM : CONTROLLED SUBSTANCE AGREEMENT (below)            Encounter-Level CSA - 02/12/2018:            Controlled Substance Agreement - Scan on 5/9/2016  9:52 AM : CONTROLLED SUBSTANCE AGREEMENT (below)            Last [facility] website verification:  done on 10.19.18       Last Written Prescription Date:  10/19/18  Last Fill Quantity: 180,   # refills: 0    Last Office Visit with [facility] primary care provider: 8/21/2018      Clinic visit frequency required: Q 3 months     Future Office visit:     Controlled substance agreement on file: Yes:  Date 2016.     Processing:  [facility]   checked in past 3 months?  No, route to RN

## 2018-10-31 RX ORDER — TRAMADOL HYDROCHLORIDE 50 MG/1
TABLET ORAL
Qty: 180 TABLET | Refills: 0 | COMMUNITY
Start: 2018-10-31 | End: 2018-11-29

## 2018-10-31 NOTE — TELEPHONE ENCOUNTER
10/29/18--Pharmacy called said never received Rx. I will find and refax for them.  Roma Kwon/GERARDO  Duplicate  Yudith Lujan, RN, BSN  Message handled by Nurse Triage.

## 2018-11-15 DIAGNOSIS — G89.29 OTHER CHRONIC PAIN: ICD-10-CM

## 2018-11-15 DIAGNOSIS — I10 ESSENTIAL HYPERTENSION: ICD-10-CM

## 2018-11-15 DIAGNOSIS — G47.9 SLEEP DISORDER: ICD-10-CM

## 2018-11-15 NOTE — TELEPHONE ENCOUNTER
"Requested Prescriptions   Pending Prescriptions Disp Refills     metoprolol succinate (TOPROL-XL) 50 MG 24 hr tablet [Pharmacy Med Name: METOPROLOL ER SUCCINATE 50MG TABS]    Last Written Prescription Date:  8/2/18  Last Fill Quantity: 90 tablet,  # refills: 0   Last office visit: 8/21/2018 with prescribing provider:  Divine   Future Office Visit:  0   90 tablet 0     Sig: TAKE 1 TABLET(50 MG) BY MOUTH DAILY    Beta-Blockers Protocol Failed    11/15/2018  3:41 PM       Failed - Blood pressure under 140/90 in past 12 months    BP Readings from Last 3 Encounters:   08/21/18 170/90   02/12/18 138/78   11/29/17 (!) 150/100                Passed - Patient is age 6 or older       Passed - Recent (12 mo) or future (30 days) visit within the authorizing provider's specialty    Patient had office visit in the last 12 months or has a visit in the next 30 days with authorizing provider or within the authorizing provider's specialty.  See \"Patient Info\" tab in inbasket, or \"Choose Columns\" in Meds & Orders section of the refill encounter.                "

## 2018-11-16 RX ORDER — METOPROLOL SUCCINATE 50 MG/1
TABLET, EXTENDED RELEASE ORAL
Qty: 90 TABLET | Refills: 0 | Status: SHIPPED | OUTPATIENT
Start: 2018-11-16 | End: 2019-02-08

## 2018-11-16 RX ORDER — ZOLPIDEM TARTRATE 5 MG/1
TABLET ORAL
Qty: 30 TABLET | Refills: 0 | Status: SHIPPED | OUTPATIENT
Start: 2018-11-16 | End: 2018-12-12

## 2018-11-16 RX ORDER — TRAMADOL HYDROCHLORIDE 50 MG/1
TABLET ORAL
Qty: 180 TABLET | Refills: 0 | OUTPATIENT
Start: 2018-11-16

## 2018-11-16 NOTE — TELEPHONE ENCOUNTER
Controlled Substance Refill Request for traMADol (ULTRAM) 50 MG tablet  Problem List Complete:  Yes    Overview Addendum 10/19/2018  2:20 PM by Patricia Collier RN         Patient is followed by MARY ANGELES for ongoing prescription of pain medication.  All refills should be approved by this provider, or covering partner.     Medication(s): traMADol (ULTRAM) 50 MG tablet      Maximum use per month: 180  Expected duration: longterm  Narcotic agreement on file: YES  Clinic visit recommended: Q 3 months  Clinic visit frequency required: Q 3 months      Controlled substance agreement on file: Yes       Date(s): 2016     Pain Clinic evaluation in the past: No        Controlled substance agreement:  Encounter-Level CSA - 02/12/2018:            Controlled Substance Agreement - Scan on 3/27/2018 12:50 PM : CONTROLLED SUBSTANCE AGREEMENT (below)            Encounter-Level CSA - 02/12/2018:            Controlled Substance Agreement - Scan on 5/9/2016  9:52 AM : CONTROLLED SUBSTANCE AGREEMENT (below)            Last [facility] website verification:  done on 10.19.18       Last Written Prescription Date:  10/31/18  Last Fill Quantity: 180,   # refills: 0    Last Office Visit with [facility] primary care provider: 8/21/2018  Divine       Return in about 6 weeks (around 10/2/2018) for Physical Exam.       Clinic visit frequency required: Q 3 months     Future Office visit:     Controlled substance agreement on file: Yes:  Date 3/27/18.     Processing:  [facility]   checked in past 3 months?  Yes 10/19/18    _________________________________________________________________________________________________________________________        zolpidem (AMBIEN) 5 MG tablet [Pharmacy Med Name: ZOLPIDEM 5MG TABLETS] 30 tablet 0     Sig: TAKE 1 TABLET BY MOUTH EVERY NIGHT AT BEDTIME    There is no refill protocol information for this order        Last Written Prescription Date:  10/16/18  Last Fill Quantity: 30,  #  refills: 0   Last Office Visit: 8/21/2018   Future Office Visit:         Routing refill request to provider for review/approval because:  Drug not on the [facility], P or  Health refill protocol or controlled substance

## 2018-11-16 NOTE — TELEPHONE ENCOUNTER
Routing refill request to provider for review/approval because:  Drug not on the G refill protocol   Ambien refill 10.16.18  Tramadol was sent 10.31.18 #180 tabs, too early to fill yet, marked denied, please review    Patricia Collier RN, BS  Clinical Nurse Triage.

## 2018-11-16 NOTE — TELEPHONE ENCOUNTER
See bp readings.  Sent to provider.  Please advise.  Sarita Elliott RN    8/21/18  ASSESSMENT / PLAN:  (Z59.8) Does not have health insurance  (primary encounter diagnosis)  Comment: Discussed programs for cancer screening medical assistance  Plan: CARE COORDINATION REFERRAL        To assist     (G47.9) Sleep disorder  Comment: Refill  Plan: zolpidem (AMBIEN) 5 MG tablet         (G89.29) Other chronic pain  Comment: Refill  Plan: traMADol (ULTRAM) 50 MG tablet         (F33.0) Mild recurrent major depression (H)  Comment: Now in  partial remission  Plan: PARoxetine (PAXIL) 20 MG tablet         (I10) Essential hypertension  Comment: Not controlled  Plan: metoprolol succinate (TOPROL-XL) 50 MG 24 hr         tablet       Increase     (L71.9) Rosacea  Comment: Discussed  Plan: metroNIDAZOLE (METROGEL) 0.75 % topical gel                  Julian Haskins MD               Instructions        Return in about 6 weeks (around 10/2/2018) for Physical Exam.

## 2018-11-17 DIAGNOSIS — F33.41 RECURRENT MAJOR DEPRESSIVE DISORDER, IN PARTIAL REMISSION (H): ICD-10-CM

## 2018-11-19 ENCOUNTER — HEALTH MAINTENANCE LETTER (OUTPATIENT)
Age: 58
End: 2018-11-19

## 2018-11-19 RX ORDER — PAROXETINE 20 MG/1
TABLET, FILM COATED ORAL
Qty: 90 TABLET | Refills: 0 | Status: SHIPPED | OUTPATIENT
Start: 2018-11-19 | End: 2019-02-08

## 2018-11-19 NOTE — TELEPHONE ENCOUNTER
"Requested Prescriptions   Pending Prescriptions Disp Refills     PARoxetine (PAXIL) 20 MG tablet [Pharmacy Med Name: PAROXETINE 20MG TABLETS]  Last Written Prescription Date:  8/21/18  Last Fill Quantity: 90 TABLET,  # refills: 0   Last office visit: 8/21/2018 with prescribing provider:  JUNG   Future Office Visit:     90 tablet 0     Sig: TAKE 1 TABLET BY MOUTH EVERY DAY WITH DINNER    SSRIs Protocol Failed    11/17/2018  1:01 PM       Failed - PHQ-9 score less than 5 in past 6 months    Please review last PHQ-9 score.   PHQ-9 SCORE 11/29/2017 2/12/2018 8/21/2018   Total Score - - -   Total Score MyChart - 13 (Moderate depression) 7 (Mild depression)   Total Score 10 13 7     ORTIZ-7 SCORE 4/21/2017 11/29/2017 8/21/2018   Total Score - - -   Total Score - - 12 (moderate anxiety)   Total Score 16 12 12                Passed - Patient is age 18 or older       Passed - No active pregnancy on record       Passed - No positive pregnancy test in last 12 months       Passed - Recent (6 mo) or future (30 days) visit within the authorizing provider's specialty    Patient had office visit in the last 6 months or has a visit in the next 30 days with authorizing provider or within the authorizing provider's specialty.  See \"Patient Info\" tab in inbasket, or \"Choose Columns\" in Meds & Orders section of the refill encounter.              "

## 2018-11-19 NOTE — TELEPHONE ENCOUNTER
Rx to PCP  SW to assist with finances has been unsuccessful    Health Maintenance Topics with due status: Overdue       Topic Date Due    HIV SCREEN (SYSTEM ASSIGNED) 05/11/1978    ASTHMA ACTION PLAN Q1 YR 04/21/2018    DEPRESSION ACTION PLAN Q1 YR 04/21/2018    MICROALBUMIN Q1 YEAR 07/11/2018    URINE DRUG SCREEN Q1 YR 07/11/2018    INFLUENZA VACCINE 09/01/2018    PAP Q3 YR 09/15/2018     Failing controlled substance guideline last BP elevated  Julian Haskins

## 2018-11-19 NOTE — TELEPHONE ENCOUNTER
Was to RTC in 6 weeks.  Please advise.  Sarita Elliott RN    8/21/18  ASSESSMENT / PLAN:  (Z59.8) Does not have health insurance  (primary encounter diagnosis)  Comment: Discussed programs for cancer screening medical assistance  Plan: CARE COORDINATION REFERRAL        To assist     (G47.9) Sleep disorder  Comment: Refill  Plan: zolpidem (AMBIEN) 5 MG tablet         (G89.29) Other chronic pain  Comment: Refill  Plan: traMADol (ULTRAM) 50 MG tablet         (F33.0) Mild recurrent major depression (H)  Comment: Now in  partial remission  Plan: PARoxetine (PAXIL) 20 MG tablet         (I10) Essential hypertension  Comment: Not controlled  Plan: metoprolol succinate (TOPROL-XL) 50 MG 24 hr         tablet       Increase     (L71.9) Rosacea  Comment: Discussed  Plan: metroNIDAZOLE (METROGEL) 0.75 % topical gel                  Julian Haskins MD               Instructions        Return in about 6 weeks (around 10/2/2018) for Physical Exam.

## 2018-11-29 DIAGNOSIS — G89.29 OTHER CHRONIC PAIN: ICD-10-CM

## 2018-11-29 NOTE — TELEPHONE ENCOUNTER
Last Written Prescription Date:  10.31.18  Last Fill Quantity: 180,  # refills: 0   Last office visit: 2018 with prescribing provider:  Divine   Future Office Visit:      Requested Prescriptions   Pending Prescriptions Disp Refills     traMADol (ULTRAM) 50 MG tablet [Pharmacy Med Name: TRAMADOL 50MG TABLETS] 180 tablet 0     Sig: TAKE 2 TABLETS BY MOUTH THREE TIMES DAILY AS NEEDED FOR SEVERE PAIN    There is no refill protocol information for this order        Routing refill request to provider for review/approval because:  Drug not on the [facility] refill protocol   : 18, last fill 10.31.18  Annual Controlled substance agreement: 18  Annual drug screenin17    Jamee Collier RN

## 2018-11-30 RX ORDER — TRAMADOL HYDROCHLORIDE 50 MG/1
TABLET ORAL
Qty: 180 TABLET | Refills: 0 | Status: SHIPPED | OUTPATIENT
Start: 2018-11-30 | End: 2019-01-02

## 2018-12-12 DIAGNOSIS — G47.9 SLEEP DISORDER: ICD-10-CM

## 2018-12-13 NOTE — TELEPHONE ENCOUNTER
zolpidem (AMBIEN) 5 MG tablet  Last Written Prescription Date:  11/16/18  Last Fill Quantity: 30 tablet,   # refills: 0  Last Office Visit: 8/21/18 Divine  Future Office visit:       Routing refill request to provider for review/approval because:  Drug not on the [facility], [facility] or [facility] refill protocol or controlled substance

## 2018-12-15 ENCOUNTER — TELEPHONE (OUTPATIENT)
Dept: FAMILY MEDICINE | Facility: CLINIC | Age: 58
End: 2018-12-15

## 2018-12-15 NOTE — TELEPHONE ENCOUNTER
Pt calling has been out of zolpidem for 2 days and is not sleeping.     Advised can try chamomile tea, lavender or OTC melatonin.   She NOT use benadryl or Nyquil as this is contraindicated with tramadol per [employer]    Funmilayo Cruz RN

## 2018-12-15 NOTE — TELEPHONE ENCOUNTER
[place] reviewed:  reviewed- no concerns    Pt last filled on 11/16 # 30 -  She states she has been out for a couple days.  Likely not taking as directed.     Funmilayo Cruz RN       [place]  [email or web address]

## 2018-12-17 RX ORDER — ZOLPIDEM TARTRATE 5 MG/1
TABLET ORAL
Qty: 30 TABLET | Refills: 1 | Status: SHIPPED | OUTPATIENT
Start: 2018-12-17 | End: 2019-02-08

## 2019-01-02 DIAGNOSIS — G89.29 OTHER CHRONIC PAIN: ICD-10-CM

## 2019-01-03 NOTE — TELEPHONE ENCOUNTER
[place] reviewed:  reviewed- no concerns    [place]:  [email or web address]

## 2019-01-03 NOTE — TELEPHONE ENCOUNTER
Controlled Substance Refill Request for traMADol (ULTRAM) 50 MG tablet  Problem List Complete:    Yes  Overview Addendum 10/19/2018  2:20 PM by Patricia Collier RN      Patient is followed by MARY ANGELES for ongoing prescription of pain medication.  All refills should be approved by this provider, or covering partner.     Medication(s): traMADol (ULTRAM) 50 MG tablet      Maximum use per month: 180  Expected duration: longterm  Narcotic agreement on file: YES  Clinic visit recommended: Q 3 months  Clinic visit frequency required: Q 3 months      Controlled substance agreement on file: Yes       Date(s): 2016     Pain Clinic evaluation in the past: No        Controlled substance agreement:  Encounter-Level CSA - 02/12/2018:            Controlled Substance Agreement - Scan on 3/27/2018 12:50 PM : CONTROLLED SUBSTANCE AGREEMENT (below)                Encounter-Level CSA - 02/12/2018:            Controlled Substance Agreement - Scan on 5/9/2016  9:52 AM : CONTROLLED SUBSTANCE AGREEMENT (below)                   Last [facility] website verification:  done on 10.19.18           Last Written Prescription Date:  11/30/18  Last Fill Quantity: 180,   # refills: 0    [place] [facility] Visit [place] [facility] [place] provider: 8/21/2018      Clinic visit frequency required: Q 3 months     Future Office visit:     Last Urine Drug Screen: No results found for: [facility] Drug Analysis UR   Date Value Ref Range Status   07/11/2017   Final    FINAL  (Note)  ====================================================================  COMPREHENSIVE DRUG ANALYSIS,UR  ====================================================================  Test                             Result       Flag       Units    Drug Present   Carboxy-THC                    480                     ng/mg creat    Carboxy-THC is a metabolite of tetrahydrocannabinol  (THC).    Source of THC is most commonly illicit, but THC is also present    in a scheduled prescription  medication.     Tramadol                       PRESENT   O-Desmethyltramadol            PRESENT   N-Desmethyltramadol            PRESENT    Source of tramadol is a prescription medication.    O-desmethyltramadol and N-desmethyltramadol are expected    metabolites of tramadol.     Zolpidem                       PRESENT   Paroxetine                     PRESENT  ====================================================================  Test                      Result    Flag   Units      Ref Range   Creatinine              50               mg/dL      >=20  ====================================================================  For clinical consultation, please call [phone].  ====================================================================  Analysis performed by [employer] [place] [address]     , No results found for: THC13, PCP13, COC13, MAMP13, OPI13, AMP13, BZO13, TCA13, MTD13, BAR13, OXY13, PPX13, BUP13     Processing:  Faxed to pharm   checked in past 3 months?  Yes 10/18/18

## 2019-01-04 RX ORDER — TRAMADOL HYDROCHLORIDE 50 MG/1
TABLET ORAL
Qty: 180 TABLET | Refills: 0 | Status: SHIPPED | OUTPATIENT
Start: 2019-01-04 | End: 2019-02-08

## 2019-02-08 DIAGNOSIS — F33.41 RECURRENT MAJOR DEPRESSIVE DISORDER, IN PARTIAL REMISSION (H): ICD-10-CM

## 2019-02-08 DIAGNOSIS — I10 ESSENTIAL HYPERTENSION: ICD-10-CM

## 2019-02-08 DIAGNOSIS — G47.9 SLEEP DISORDER: ICD-10-CM

## 2019-02-08 DIAGNOSIS — G89.29 OTHER CHRONIC PAIN: ICD-10-CM

## 2019-02-08 NOTE — TELEPHONE ENCOUNTER
"Requested Prescriptions   Pending Prescriptions Disp Refills     metoprolol succinate ER (TOPROL-XL) 50 MG 24 hr tablet [Pharmacy Med Name: METOPROLOL ER SUCCINATE 50MG TABS]  Last Written Prescription Date:  11/16/2018  Last Fill Quantity: 90 tablet,  # refills: 0   Last office visit: 8/21/2018 with prescribing provider:  Julian Haskins MD    Future Office Visit:     90 tablet 0     Sig: TAKE 1 TABLET(50 MG) BY MOUTH DAILY    Beta-Blockers Protocol Failed - 2/8/2019 12:57 PM       Failed - Blood pressure under 140/90 in past 12 months    BP Readings from Last 3 Encounters:   08/21/18 170/90   02/12/18 138/78   11/29/17 (!) 150/100                Passed - Patient is age 6 or older       Passed - Recent (12 mo) or future (30 days) visit within the authorizing provider's specialty    Patient had office visit in the last 12 months or has a visit in the next 30 days with authorizing provider or within the authorizing provider's specialty.  See \"Patient Info\" tab in inbasket, or \"Choose Columns\" in Meds & Orders section of the refill encounter.             Passed - Medication is active on med list        PARoxetine (PAXIL) 20 MG tablet [Pharmacy Med Name: PAROXETINE 20MG TABLETS]  Last Written Prescription Date:  11/19/2018  Last Fill Quantity: 90 tablet,  # refills: 0   Last office visit: 8/21/2018 with prescribing provider:  Julian Haskins MD    Future Office Visit:     90 tablet 0     Sig: TAKE 1 TABLET BY MOUTH EVERY DAY WITH DINNER    SSRIs Protocol Failed - 2/8/2019 12:57 PM       Failed - PHQ-9 score less than 5 in past 6 months    Please review last PHQ-9 score.   PHQ-9 SCORE 11/29/2017 2/12/2018 8/21/2018   PHQ-9 Total Score - - -   PHQ-9 Total Score MyChart - 13 (Moderate depression) 7 (Mild depression)   PHQ-9 Total Score 10 13 7     ORTIZ-7 SCORE 4/21/2017 11/29/2017 8/21/2018   Total Score - - -   Total Score - - 12 (moderate anxiety)   Total Score 16 12 12              Passed - Medication is active on med " "list       Passed - Patient is age 18 or older       Passed - No active pregnancy on record       Passed - No positive pregnancy test in last 12 months       Passed - Recent (6 mo) or future (30 days) visit within the authorizing provider's specialty    Patient had office visit in the last 6 months or has a visit in the next 30 days with authorizing provider or within the authorizing provider's specialty.  See \"Patient Info\" tab in inbasket, or \"Choose Columns\" in Meds & Orders section of the refill encounter.            traMADol (ULTRAM) 50 MG tablet [Pharmacy Med Name: TRAMADOL 50MG TABLETS]  Last Written Prescription Date:  01/04/2019  Last Fill Quantity: 180 tablet,  # refills: 0   Last office visit: 8/21/2018 with prescribing provider:  Julian Haskins MD    Future Office Visit:     180 tablet 0     Sig: TAKE 2 TABLETS BY MOUTH THREE TIMES DAILY AS NEEDED FOR SEVERE PAIN    There is no refill protocol information for this order     Routing refill request to provider for review/approval because:  Drug not on the [facility], [facility] or [employer] refill protocol or controlled substance       zolpidem (AMBIEN) 5 MG tablet [Pharmacy Med Name: ZOLPIDEM 5MG TABLETS]  Last Written Prescription Date:  12/17/2018  Last Fill Quantity: 30 tablet,  # refills: 1   Last office visit: 8/21/2018 with prescribing provider:  Julian Haskins MD    Future Office Visit:     30 tablet 0     Sig: TAKE 1 TABLET BY MOUTH EVERY DAY AT BEDTIME    There is no refill protocol information for this order     Routing refill request to provider for review/approval because:  Drug not on the [facility], P or [employer] refill protocol or controlled substance         "

## 2019-02-11 RX ORDER — PAROXETINE 20 MG/1
TABLET, FILM COATED ORAL
Qty: 90 TABLET | Refills: 0 | Status: SHIPPED | OUTPATIENT
Start: 2019-02-11 | End: 2019-05-14

## 2019-02-11 RX ORDER — TRAMADOL HYDROCHLORIDE 50 MG/1
TABLET ORAL
Qty: 90 TABLET | Refills: 0 | Status: SHIPPED | OUTPATIENT
Start: 2019-02-11 | End: 2019-08-05

## 2019-02-11 RX ORDER — METOPROLOL SUCCINATE 50 MG/1
TABLET, EXTENDED RELEASE ORAL
Qty: 90 TABLET | Refills: 0 | Status: SHIPPED | OUTPATIENT
Start: 2019-02-11 | End: 2019-05-14

## 2019-02-11 RX ORDER — ZOLPIDEM TARTRATE 5 MG/1
TABLET ORAL
Qty: 30 TABLET | Refills: 0 | Status: SHIPPED | OUTPATIENT
Start: 2019-02-11 | End: 2019-03-13

## 2019-02-11 NOTE — TELEPHONE ENCOUNTER
Must have follow up this month to continue the tramadol see altered sig to facilitate taper if she decides to to off

## 2019-02-11 NOTE — TELEPHONE ENCOUNTER
Routing refill request to provider to review approval because:  Blood pressure out of range   Drug not on the [facility], [facility] or  Health refill protocol or controlled substance  ? Pt has no insurance, due for f/u visit    Yudith Lujan RN, BSN  Message handled by Nurse Triage.

## 2019-02-25 ENCOUNTER — TELEPHONE (OUTPATIENT)
Dept: FAMILY MEDICINE | Facility: CLINIC | Age: 59
End: 2019-02-25

## 2019-03-04 NOTE — TELEPHONE ENCOUNTER
Type of outreach:  Phone, left message for patient to call back.  Kelsey DUMONT

## 2019-03-09 ENCOUNTER — HEALTH MAINTENANCE LETTER (OUTPATIENT)
Age: 59
End: 2019-03-09

## 2019-03-13 DIAGNOSIS — G47.9 SLEEP DISORDER: ICD-10-CM

## 2019-03-14 NOTE — TELEPHONE ENCOUNTER
Requested Prescriptions   Pending Prescriptions Disp Refills     zolpidem (AMBIEN) 5 MG tablet [Pharmacy Med Name: ZOLPIDEM 5MG TABLETS] 30 tablet 0     Sig: TAKE 1 TABLET BY MOUTH EVER DAY AT BEDTIME    There is no refill protocol information for this order        zolpidem (AMBIEN) 5 MG tablet  Last Written Prescription Date: 2/11/19    Last Fill Quantity: 30 tablet,   # refills: 0  Last Office Visit: [facility] 8/21/18  Future Office visit:       Routing refill request to provider for review/approval because:  Drug not on the [facility], [facility] or [facility] refill protocol or controlled substance

## 2019-03-15 RX ORDER — ZOLPIDEM TARTRATE 5 MG/1
TABLET ORAL
Qty: 30 TABLET | Refills: 0 | Status: SHIPPED | OUTPATIENT
Start: 2019-03-15 | End: 2019-04-15

## 2019-03-15 NOTE — TELEPHONE ENCOUNTER
Routing refill request to provider to review approval because:  Drug not on the [facility] or [facility] refill protocol or controlled substance  Yudith Lujan RN, BSN  Message handled by Nurse Triage.

## 2019-04-11 ENCOUNTER — TELEPHONE (OUTPATIENT)
Dept: FAMILY MEDICINE | Facility: CLINIC | Age: 59
End: 2019-04-11

## 2019-04-11 NOTE — TELEPHONE ENCOUNTER
Pt calls, c/o cough > 1 week, SOB, sounds miserable, hx of asthma but thought it would be improved since stopped smoking, [facility] but did not [facility] to [facility] so left, discussed at length, needs appointment for eval, discussed UC, [facility] etc, pt agrees, will proceed with [facility] since feels cheaper in cost    Yudith Lujan RN, BSN  Message handled by Nurse Triage.

## 2019-05-14 DIAGNOSIS — G47.9 SLEEP DISORDER: ICD-10-CM

## 2019-05-14 DIAGNOSIS — I10 ESSENTIAL HYPERTENSION: ICD-10-CM

## 2019-05-14 DIAGNOSIS — F33.41 RECURRENT MAJOR DEPRESSIVE DISORDER, IN PARTIAL REMISSION (H): ICD-10-CM

## 2019-05-14 NOTE — TELEPHONE ENCOUNTER
"zolpidem (AMBIEN) 5 MG tablet      Last Written Prescription Date:  4/17/19  Last Fill Quantity: 30 tablet,   # refills: 0  Last Office Visit: 2/12/2018 (JARED Davila)  Future Office visit:       Routing refill request to provider for review/approval because:  Drug not on the [facility], P or [facility] refill protocol or controlled substance    PARoxetine (PAXIL) 20 MG tablet  Last Written Prescription Date:  2/11/19  Last Fill Quantity: 90 tablet,  # refills: 0   Last office visit: 2/12/2018 with prescribing provider:  JARED Davila   Future Office Visit:      [facility] Follow-up to PHQ 11/29/2017 2/12/2018 8/21/2018   PHQ-9 9. Suicide Ideation past 2 weeks Not at all Not at all Not at all     ORTIZ-7 SCORE 4/21/2017 11/29/2017 8/21/2018   Total Score - - -   Total Score - - 12 (moderate anxiety)   Total Score 16 12 12       metoprolol succinate ER (TOPROL-XL) 50 MG 24 hr tablet  Last Written Prescription Date:  2/11/19  Last Fill Quantity: 90 tablet,  # refills: 0   Last office visit: 2/12/2018 with prescribing provider:  JARED Davila   Future Office Visit:        Requested Prescriptions   Pending Prescriptions Disp Refills     zolpidem (AMBIEN) 5 MG tablet [Pharmacy Med Name: ZOLPIDEM 5MG TABLETS] 30 tablet 0     Sig: TAKE 1 TABLET BY MOUTH EVERY DAY AT BEDTIME       There is no refill protocol information for this order        PARoxetine (PAXIL) 20 MG tablet [Pharmacy Med Name: PAROXETINE 20MG TABLETS] 90 tablet 0     Sig: TAKE 1 TABLET BY MOUTH EVERY DAY WITH DINNER       SSRIs Protocol Failed - 5/14/2019 10:49 AM        Failed - PHQ-9 score less than 5 in past 6 months     Please review last PHQ-9 score.           Failed - Recent (6 mo) or future (30 days) visit within the authorizing provider's specialty     Patient had office visit in the last 6 months or has a visit in the next 30 days with authorizing provider or within the authorizing provider's specialty.  See \"Patient Info\" tab in inbasket, or \"Choose Columns\" in Meds & " "Orders section of the refill encounter.            Passed - Medication is active on med list        Passed - Patient is age 18 or older        Passed - No active pregnancy on record        Passed - No positive pregnancy test in last 12 months        metoprolol succinate ER (TOPROL-XL) 50 MG 24 hr tablet [Pharmacy Med Name: METOPROLOL ER SUCCINATE 50MG TABS] 90 tablet 0     Sig: TAKE 1 TABLET(50 MG) BY MOUTH DAILY       Beta-Blockers Protocol Failed - 5/14/2019 10:49 AM        Failed - Blood pressure under 140/90 in past 12 months     BP Readings from Last 3 Encounters:   08/21/18 170/90   02/12/18 138/78   11/29/17 (!) 150/100                 Passed - Patient is age 6 or older        Passed - Recent (12 mo) or future (30 days) visit within the authorizing provider's specialty     Patient had office visit in the last 12 months or has a visit in the next 30 days with authorizing provider or within the authorizing provider's specialty.  See \"Patient Info\" tab in inbasket, or \"Choose Columns\" in Meds & Orders section of the refill encounter.              Passed - Medication is active on med list          "

## 2019-05-15 RX ORDER — ZOLPIDEM TARTRATE 5 MG/1
TABLET ORAL
Qty: 30 TABLET | Refills: 0 | Status: SHIPPED | OUTPATIENT
Start: 2019-05-15 | End: 2019-06-12

## 2019-05-15 RX ORDER — PAROXETINE 20 MG/1
TABLET, FILM COATED ORAL
Qty: 90 TABLET | Refills: 0 | Status: SHIPPED | OUTPATIENT
Start: 2019-05-15 | End: 2019-08-12

## 2019-05-15 RX ORDER — METOPROLOL SUCCINATE 50 MG/1
TABLET, EXTENDED RELEASE ORAL
Qty: 90 TABLET | Refills: 0 | Status: SHIPPED | OUTPATIENT
Start: 2019-05-15 | End: 2019-08-12

## 2019-05-15 NOTE — TELEPHONE ENCOUNTER
Routing refill request to provider for review/approval because:  Drug not on the [facility] refill protocol   Labs out of range:  Bp  Labs not current:  PHq-9    PHQ-9 SCORE 11/29/2017 2/12/2018 8/21/2018   PHQ-9 Total Score - - -   PHQ-9 Total Score MyChart - 13 (Moderate depression) 7 (Mild depression)   PHQ-9 Total Score 10 13 7     Quin Clark, RN, BSN

## 2019-05-27 ENCOUNTER — OFFICE VISIT (OUTPATIENT)
Dept: URGENT CARE | Facility: URGENT CARE | Age: 59
End: 2019-05-27

## 2019-05-27 VITALS
HEART RATE: 95 BPM | WEIGHT: 170.3 LBS | BODY MASS INDEX: 28.34 KG/M2 | OXYGEN SATURATION: 93 % | TEMPERATURE: 100.2 F | SYSTOLIC BLOOD PRESSURE: 162 MMHG | DIASTOLIC BLOOD PRESSURE: 78 MMHG

## 2019-05-27 DIAGNOSIS — R05.9 COUGH: Primary | ICD-10-CM

## 2019-05-27 DIAGNOSIS — R06.2 WHEEZING: ICD-10-CM

## 2019-05-27 PROCEDURE — 99214 OFFICE O/P EST MOD 30 MIN: CPT | Performed by: PHYSICIAN ASSISTANT

## 2019-05-27 RX ORDER — AZITHROMYCIN 250 MG/1
TABLET, FILM COATED ORAL
Qty: 6 TABLET | Refills: 0 | Status: SHIPPED | OUTPATIENT
Start: 2019-05-27 | End: 2019-08-05

## 2019-05-27 RX ORDER — ALBUTEROL SULFATE 90 UG/1
2 AEROSOL, METERED RESPIRATORY (INHALATION) EVERY 4 HOURS PRN
Qty: 8.5 G | Refills: 1 | Status: SHIPPED | OUTPATIENT
Start: 2019-05-27 | End: 2019-09-17

## 2019-06-12 DIAGNOSIS — G47.9 SLEEP DISORDER: ICD-10-CM

## 2019-06-13 NOTE — TELEPHONE ENCOUNTER
Requested Prescriptions   Pending Prescriptions Disp Refills     zolpidem (AMBIEN) 5 MG tablet [Pharmacy Med Name: ZOLPIDEM 5MG TABLETS] 30 tablet 0     Sig: TAKE 1 TABLET BY MOUTH EVERY DAY AT BEDTIME       There is no refill protocol information for this order      zolpidem (AMBIEN) 5 MG tablet    Last Written Prescription Date:5/15/19    Last Fill Quantity: 30 tablet,   # refills: 0  Last Office Visit: 8/21/18 Divine  Future Office visit:       Routing refill request to provider for review/approval because:  Drug not on the [facility], [facility] or [facility] refill protocol or controlled substance

## 2019-06-15 RX ORDER — ZOLPIDEM TARTRATE 5 MG/1
TABLET ORAL
Qty: 30 TABLET | Refills: 0 | Status: SHIPPED | OUTPATIENT
Start: 2019-06-15 | End: 2019-07-16

## 2019-07-02 NOTE — PROGRESS NOTES
SUBJECTIVE:  Miguelina Soria is a 59 year old female who presents to the clinic today with a chief complaint of cough  and wheezing. for 3 day(s).  Her cough is described as cough is mild but chest feels tight and little hard to breath.  Hx of asthma sx.  Has neb but med .  Would like inhaler.  No fevers.  Denies chest pain.    The patient's symptoms are mild and stable.  Associated symptoms include mild HA and cold sx . The patient's symptoms are exacerbated by no particular triggers  Patient has been using Nicci seltzer  to improve symptoms.    Past Medical History:   Diagnosis Date     Depressive disorder, not elsewhere classified      Hyperlipidaemia        Current Outpatient Medications   Medication Sig Dispense Refill     albuterol (PROAIR HFA/PROVENTIL HFA/VENTOLIN HFA) 108 (90 Base) MCG/ACT inhaler Inhale 2 puffs into the lungs every 4 hours as needed for shortness of breath / dyspnea or wheezing 8.5 g 1     azithromycin (ZITHROMAX) 250 MG tablet Two tablets first day, then one tablet daily for four days. 6 tablet 0     metoprolol succinate ER (TOPROL-XL) 50 MG 24 hr tablet TAKE 1 TABLET(50 MG) BY MOUTH DAILY 90 tablet 0     omeprazole (PRILOSEC) 20 MG CR capsule Take 1 capsule (20 mg) by mouth daily 90 capsule 3     Omeprazole 20 MG tablet Take 20 mg by mouth daily. 90 tablet 1     PARoxetine (PAXIL) 20 MG tablet TAKE 1 TABLET BY MOUTH EVERY DAY WITH DINNER 90 tablet 0     metroNIDAZOLE (METROGEL) 0.75 % topical gel Apply topically 2 times daily (Patient not taking: Reported on 2019) 45 g 1     traMADol (ULTRAM) 50 MG tablet Take one or two three times daily as needed for pain SEE MD THIS MONTH (Patient not taking: Reported on 2019) 90 tablet 0     zolpidem (AMBIEN) 5 MG tablet TAKE 1 TABLET BY MOUTH EVERY DAY AT BEDTIME 30 tablet 0       Social History     Tobacco Use     Smoking status: Former Smoker     Packs/day: 0.50     Years: 40.00     Pack years: 20.00     Types: Cigarettes     Last  attempt to quit: 2012     Years since quittin.8     Smokeless tobacco: Never Used   Substance Use Topics     Alcohol use: No     Comment: Quit on 2007       ROS  Review of systems negative except as stated above.    OBJECTIVE:  /78   Pulse 95   Temp 100.2  F (37.9  C) (Tympanic)   Wt 77.2 kg (170 lb 4.8 oz)   LMP 2010   SpO2 93%   BMI 28.34 kg/m    GENERAL APPEARANCE: healthy, alert and no distress  EYES: EOMI,  PERRL, conjunctiva clear  HENT: ear canals and TM's normal.  Nose and mouth without ulcers, erythema or lesions  NECK: supple, nontender, no lymphadenopathy  RESP: very mild late expiratory wheezing noted.  CV: regular rates and rhythm, normal S1 S2, no murmur noted  NEURO: Normal strength and tone, sensory exam grossly normal,  normal speech and mentation  SKIN: no suspicious lesions or rashes    assessment/plan:  (R05) Cough  (primary encounter diagnosis)  Comment:   Plan: albuterol (PROAIR HFA/PROVENTIL HFA/VENTOLIN         HFA) 108 (90 Base) MCG/ACT inhaler,         azithromycin (ZITHROMAX) 250 MG tablet          Appears to be viral reactive at this time. Inhaler as needed.  No Prednisone at this time.  OTC med for sx relief.  Hold Zithromax and start if sx persist for 1 more week or high fevers develop.      (R06.2) Wheezing  Comment:   Plan: albuterol (PROAIR HFA/PROVENTIL HFA/VENTOLIN         HFA) 108 (90 Base) MCG/ACT inhaler        As above

## 2019-08-05 ENCOUNTER — ANCILLARY PROCEDURE (OUTPATIENT)
Dept: GENERAL RADIOLOGY | Facility: CLINIC | Age: 59
End: 2019-08-05
Attending: PHYSICIAN ASSISTANT
Payer: COMMERCIAL

## 2019-08-05 ENCOUNTER — OFFICE VISIT (OUTPATIENT)
Dept: FAMILY MEDICINE | Facility: CLINIC | Age: 59
End: 2019-08-05
Payer: COMMERCIAL

## 2019-08-05 VITALS
SYSTOLIC BLOOD PRESSURE: 188 MMHG | RESPIRATION RATE: 12 BRPM | WEIGHT: 165 LBS | OXYGEN SATURATION: 100 % | BODY MASS INDEX: 27.46 KG/M2 | TEMPERATURE: 98 F | DIASTOLIC BLOOD PRESSURE: 104 MMHG | HEART RATE: 84 BPM

## 2019-08-05 DIAGNOSIS — M25.522 LEFT ELBOW PAIN: ICD-10-CM

## 2019-08-05 DIAGNOSIS — M25.532 LEFT WRIST PAIN: ICD-10-CM

## 2019-08-05 DIAGNOSIS — I10 HYPERTENSION GOAL BP (BLOOD PRESSURE) < 140/90: ICD-10-CM

## 2019-08-05 DIAGNOSIS — S52.572A OTHER CLOSED INTRA-ARTICULAR FRACTURE OF DISTAL END OF LEFT RADIUS, INITIAL ENCOUNTER: Primary | ICD-10-CM

## 2019-08-05 PROBLEM — Z53.9 NO SHOW: Status: RESOLVED | Noted: 2018-01-09 | Resolved: 2019-08-05

## 2019-08-05 PROCEDURE — 73080 X-RAY EXAM OF ELBOW: CPT | Mod: LT

## 2019-08-05 PROCEDURE — 99214 OFFICE O/P EST MOD 30 MIN: CPT | Performed by: PHYSICIAN ASSISTANT

## 2019-08-05 PROCEDURE — 73110 X-RAY EXAM OF WRIST: CPT | Mod: LT

## 2019-08-05 RX ORDER — OXYCODONE AND ACETAMINOPHEN 5; 325 MG/1; MG/1
1 TABLET ORAL EVERY 6 HOURS PRN
Qty: 12 TABLET | Refills: 0 | Status: SHIPPED | OUTPATIENT
Start: 2019-08-05 | End: 2019-09-04

## 2019-08-05 NOTE — PATIENT INSTRUCTIONS
Follow up with orthopedics in the next week.    Use Aleve as needed along with Percocet as needed for pain.

## 2019-08-05 NOTE — PROGRESS NOTES
Subjective     Miguelina Soria is a 59 year old female who presents to clinic today for the following health issues:    HPI   Joint Pain    Onset: 5 days    Description:   Location: left wrist and left elbow  Character: Sharp    Intensity: severe, 8/10 in severity    Progression of Symptoms: worse, same    Accompanying Signs & Symptoms:  Other symptoms: numbness, tingling, weakness of wrist and swelling  Patient notes when she moves the wrist and arm it feels like something is moving/popping.    History:   Previous similar pain: no       Precipitating factors:   Trauma or overuse: YES- fell in the shower    Alleviating factors:  Improved by: ice    Therapies Tried and outcome: Aleve, ice    Of note patient is right handed.    Patient Active Problem List   Diagnosis     Insomnia     GERD (gastroesophageal reflux disease)     Hypertension goal BP (blood pressure) < 140/90     Anxiety     Hyperlipidemia LDL goal <160     Major depressive disorder, recurrent episode, in full remission (H)     Chronic pain     Other chronic pain       Current Outpatient Medications   Medication     albuterol (PROAIR HFA/PROVENTIL HFA/VENTOLIN HFA) 108 (90 Base) MCG/ACT inhaler     metoprolol succinate ER (TOPROL-XL) 50 MG 24 hr tablet     omeprazole (PRILOSEC) 20 MG CR capsule     PARoxetine (PAXIL) 20 MG tablet     zolpidem (AMBIEN) 5 MG tablet     No current facility-administered medications for this visit.        No Known Allergies    Reviewed and updated as needed this visit by Provider  Allergies  Meds  Problems  Med Hx         Review of Systems   GENERAL:  No fevers  EXTREMITIES: As noted in HPI        Objective    BP (!) 188/104 (BP Location: Right arm, Patient Position: Chair, Cuff Size: Adult Regular)   Pulse 84   Temp 98  F (36.7  C) (Oral)   Resp 12   Wt 74.8 kg (165 lb)   LMP 01/01/2010   SpO2 100%   BMI 27.46 kg/m    Body mass index is 27.46 kg/m .  Physical Exam   GENERAL: No acute distress  HEENT:  Normocephalic  VASCULAR: 2+ left radial pulse  EXTREMITIES:  Left upper extremity: Swelling and tenderness over the distal radius. No tenderness over the distal ulna. Tenderness over the radial head but no swelling. Discomfort with supination. Able to oppose thumb to all fingers.  NEURO: Alert, non-focal    Diagnostic Test Results:  LEFT ELBOW THREE OR MORE VIEWS;  LEFT WRIST THREE OR MORE VIEWS 8/5/2019 11:33 AM      HISTORY: Fall with elbow and wrist pain.     COMPARISON: None.                                                         IMPRESSION:   1. Left elbow: No acute or chronic bony abnormality. No joint space  effusion.  2. Left wrist: Comminuted intra-articular fracture distal radial  metaphysis without significant displacement. No other acute or  significant chronic bony abnormalities. Soft tissue swelling about the  wrist.        Assessment & Plan     1. Other closed intra-articular fracture of distal end of left radius, initial encounter  Patient does have an intra-articular fracture of the distal left radius.  She was placed in a wrist splint with Ace bandage.  Referral to orthopedics placed today for close follow-up within the next week.  Patient will schedule this on the way out.  She has had little improvement of pain with at home Aleve.  She was provided a small amount of Percocet to help with pain.  Of note [facility] was reviewed.  - XR Wrist Left G/E 3 Views; Future  - ORTHOPEDICS ADULT REFERRAL  - oxyCODONE-acetaminophen (PERCOCET) 5-325 MG tablet; Take 1 tablet by mouth every 6 hours as needed for severe pain  Dispense: 12 tablet; Refill: 0  - order for DME; Equipment being ordered: Prefab wrist splint  Dispense: 1 each; Refill: 0    2. Left elbow pain  No signs of radial head fracture.  - XR Elbow Left G/E 3 Views; Future    3. Hypertension  Blood pressure quite elevated today.  I recommended patient return for nurse only blood pressure check. If still elevated at that time adjustment in  medication may be needed. She is in quite a bit of pain today which is likely contributing to her elevated blood pressure.    Patient Instructions   Follow up with orthopedics in the next week.    Use Aleve as needed along with Percocet as needed for pain.      Return in about 1 week (around 8/12/2019) for with Orthopedics.    Paula Corona PA-C  [facility]

## 2019-08-09 ENCOUNTER — TRANSFERRED RECORDS (OUTPATIENT)
Dept: HEALTH INFORMATION MANAGEMENT | Facility: CLINIC | Age: 59
End: 2019-08-09

## 2019-08-19 ENCOUNTER — ALLIED HEALTH/NURSE VISIT (OUTPATIENT)
Dept: NURSING | Facility: CLINIC | Age: 59
End: 2019-08-19
Payer: COMMERCIAL

## 2019-08-19 VITALS
WEIGHT: 165 LBS | SYSTOLIC BLOOD PRESSURE: 164 MMHG | HEART RATE: 80 BPM | BODY MASS INDEX: 27.46 KG/M2 | DIASTOLIC BLOOD PRESSURE: 84 MMHG

## 2019-08-19 DIAGNOSIS — I10 HYPERTENSION GOAL BP (BLOOD PRESSURE) < 140/90: Primary | ICD-10-CM

## 2019-08-19 DIAGNOSIS — I10 ESSENTIAL HYPERTENSION: ICD-10-CM

## 2019-08-19 PROCEDURE — 99207 ZZC NO CHARGE NURSE ONLY: CPT

## 2019-08-19 RX ORDER — LOSARTAN POTASSIUM 50 MG/1
50 TABLET ORAL DAILY
Qty: 90 TABLET | Refills: 0 | Status: SHIPPED | OUTPATIENT
Start: 2019-08-19 | End: 2019-09-17

## 2019-08-19 NOTE — PROGRESS NOTES
Miguelina Soria is a 59 year old patient who comes in today for a Blood Pressure check.  Initial BP:  BP (!) 160/84 (BP Location: Right arm, Patient Position: Chair, Cuff Size: Adult Regular)   Pulse 80   Wt 74.8 kg (165 lb)   LMP 01/01/2010   Breastfeeding? No   BMI 27.46 kg/m       80  Recheck of BP after sitting for 15 minutes:  BP-164/88  Patient states she takes her Metoprolol 50mg daily.  Has started taking walks everyday.  Patient states she is under a lot of stress lately - selling house, broke arm, pet is sick.  Disposition: BP elevated.  Triage RN notified, patient asked to wait.    Amelia Ann CMA on 8/19/2019 at 10:58 AM

## 2019-08-19 NOTE — PROGRESS NOTES
Medication: begin: losartan 50 , you may take at same time as metoprolol   Recheck in ~3 weeks nurse only, sooner should new symptoms or  problems arise.   Schedule visit with PCP.  Scheduled 9/17/19.  Pr prefers BP checked at OV.  Message handled by Nurse Triage with [facility] - provider name: Jose A Davila MD.  Oscar Reeves RN

## 2019-08-23 ENCOUNTER — TRANSFERRED RECORDS (OUTPATIENT)
Dept: HEALTH INFORMATION MANAGEMENT | Facility: CLINIC | Age: 59
End: 2019-08-23

## 2019-09-04 ENCOUNTER — OFFICE VISIT (OUTPATIENT)
Dept: FAMILY MEDICINE | Facility: CLINIC | Age: 59
End: 2019-09-04
Payer: COMMERCIAL

## 2019-09-04 VITALS
OXYGEN SATURATION: 98 % | RESPIRATION RATE: 16 BRPM | SYSTOLIC BLOOD PRESSURE: 140 MMHG | WEIGHT: 163 LBS | BODY MASS INDEX: 27.12 KG/M2 | DIASTOLIC BLOOD PRESSURE: 76 MMHG | HEART RATE: 69 BPM | TEMPERATURE: 98.4 F

## 2019-09-04 DIAGNOSIS — J20.9 ACUTE BRONCHITIS, UNSPECIFIED ORGANISM: Primary | ICD-10-CM

## 2019-09-04 DIAGNOSIS — I10 HYPERTENSION GOAL BP (BLOOD PRESSURE) < 140/90: ICD-10-CM

## 2019-09-04 PROCEDURE — 99213 OFFICE O/P EST LOW 20 MIN: CPT | Performed by: PHYSICIAN ASSISTANT

## 2019-09-04 RX ORDER — AZITHROMYCIN 250 MG/1
TABLET, FILM COATED ORAL
Qty: 6 TABLET | Refills: 0 | Status: SHIPPED | OUTPATIENT
Start: 2019-09-04 | End: 2020-01-23

## 2019-09-04 RX ORDER — CODEINE PHOSPHATE AND GUAIFENESIN 10; 100 MG/5ML; MG/5ML
1-2 SOLUTION ORAL EVERY 6 HOURS PRN
Qty: 180 ML | Refills: 0 | Status: SHIPPED | OUTPATIENT
Start: 2019-09-04 | End: 2019-09-17

## 2019-09-04 NOTE — PROGRESS NOTES
Subjective     Miguelina Soria is a 59 year old female who presents to clinic today for the following health issues:    HPI   Acute Illness   Acute illness concerns: over a week  Onset: URI    Fever: no    Chills/Sweats: no    Headache (location?): no    Sinus Pressure:no    Conjunctivitis:  no    Ear Pain: no    Rhinorrhea: YES    Congestion: YES    Sore Throat: YES- there was, hoarse     Cough: YES- productive in nature    Wheeze: YES    Decreased Appetite: YES    Nausea: no    Vomiting: YES- only twice    Diarrhea:  no    Dysuria/Freq.: no    Fatigue/Achiness: YES    Sick/Strep Exposure: no     Therapies Tried and outcome: Benadryl, OTC cold meds-not effective    Patient smoking about 2 cigarettes daily (she notes she previously had quit). She is using an albuerol inhaler as needed.  She notes she has has trouble with asthma in the past but since quitting smoking symptoms have improved.    Patient Active Problem List   Diagnosis     Insomnia     GERD (gastroesophageal reflux disease)     Hypertension goal BP (blood pressure) < 140/90     Anxiety     Hyperlipidemia LDL goal <160     Major depressive disorder, recurrent episode, in full remission (H)     Chronic pain     Other chronic pain       Current Outpatient Medications   Medication     albuterol (PROAIR HFA/PROVENTIL HFA/VENTOLIN HFA) 108 (90 Base) MCG/ACT inhaler     losartan (COZAAR) 50 MG tablet     metoprolol succinate ER (TOPROL-XL) 50 MG 24 hr tablet     omeprazole (PRILOSEC) 20 MG CR capsule     order for DME     PARoxetine (PAXIL) 20 MG tablet     zolpidem (AMBIEN) 5 MG tablet     No current facility-administered medications for this visit.        No Known Allergies      Reviewed and updated as needed this visit by Provider  Tobacco  Allergies  Meds  Problems         Review of Systems   GENERAL:  No fevers  HEENT: As noted in HPI      Objective    BP (!) 140/76 (BP Location: Right arm, Patient Position: Chair, Cuff Size: Adult Regular)   Pulse 69    Temp 98.4  F (36.9  C) (Oral)   Resp 16   Wt 73.9 kg (163 lb)   LMP 01/01/2010   SpO2 98%   BMI 27.12 kg/m    Body mass index is 27.12 kg/m .  Physical Exam   GENERAL: No acute distress  HEENT: Normocephalic, PERRL, Canals patent, bilateral TM's non-erythematous and non-bulging. Turbinates normal in appearance bilaterally. Posterior oropharynx non-erythematous and without exudate.  NECK: No cervical or supraclavicular lymphadenopathy.  CARDIAC: Regular rate and rhythm. No murmurs.  PULMONARY: Occasional rhonchi. Lungs are clear to auscultation bilaterally. No wheezes or crackles.  NEURO: Alert and non-focal        Assessment & Plan     1. Acute bronchitis, unspecified organism  Patient treated with a azithromycin for presumed bronchitis and infection.  She was provided a prescription for cough syrup with codeine she is having difficulty controlling her cough.  Follow-up if not improving in the next week.  She can continue with over-the-counter remedies as well as albuterol  - azithromycin (ZITHROMAX) 250 MG tablet; Take 2 tablets (500 mg) by mouth daily for 1 day, THEN 1 tablet (250 mg) daily for 4 days.  Dispense: 6 tablet; Refill: 0  - guaiFENesin-codeine (ROBITUSSIN AC) 100-10 MG/5ML solution; Take 5-10 mLs by mouth every 6 hours as needed for cough  Dispense: 180 mL; Refill: 0    2. Hypertension goal BP (blood pressure) < 140/90  Blood pressure initially elevated today.  It has improved some.  Patient has follow-up appointment with primary care and her physical in the next week.  I will defer management for this appointment.    Patient Instructions   Complete the azithromycin.    Use the cough syrup as needed (do not drink alcohol or driving when taking the codeine cough syrup).    Return in about 1 week (around 9/11/2019) for if not improving or if worsening.    Paula Corona PA-C  [facility]

## 2019-09-04 NOTE — PATIENT INSTRUCTIONS
Complete the azithromycin.    Use the cough syrup as needed (do not drink alcohol or driving when taking the codeine cough syrup).

## 2019-09-13 ENCOUNTER — TRANSFERRED RECORDS (OUTPATIENT)
Dept: HEALTH INFORMATION MANAGEMENT | Facility: CLINIC | Age: 59
End: 2019-09-13

## 2019-09-13 DIAGNOSIS — G47.9 SLEEP DISORDER: ICD-10-CM

## 2019-09-13 RX ORDER — ZOLPIDEM TARTRATE 5 MG/1
TABLET ORAL
Qty: 30 TABLET | Refills: 1 | Status: SHIPPED | OUTPATIENT
Start: 2019-09-13 | End: 2019-11-07

## 2019-09-13 NOTE — TELEPHONE ENCOUNTER
Requested Prescriptions   Pending Prescriptions Disp Refills     zolpidem (AMBIEN) 5 MG tablet [Pharmacy Med Name: ZOLPIDEM 5MG TABLETS] 30 tablet      Sig: TAKE 1 TABLET BY MOUTH EVERY NIGHT AT BEDTIME AS NEEDED FOR SLEEP       There is no refill protocol information for this order            zolpidem (AMBIEN) 5 MG tablet  Last Written Prescription Date: 8/13/19   Last Fill Quantity: 30 tablets,   # refills: 0  Last Office Visit: Chloe  9/4/19  Future Office visit:    Next 5 appointments (look out 90 days)    Sep 17, 2019  1:50 PM CDT  Adult physical with Jose A Davila MD, CR EXAM ROOM 27  [facility] [address] [place] [id number] [phone]           Routing refill request to provider for review/approval because:  Drug not on the [facility], [facility] or  Health refill protocol or controlled substance

## 2019-09-13 NOTE — TELEPHONE ENCOUNTER
8/21/18 last visit with a primary provider.  Upcoming appt 9/17/19.  Not [facility] med.  Sent to provider.  Please advise.  Sarita Elliott RN

## 2019-09-17 ENCOUNTER — OFFICE VISIT (OUTPATIENT)
Dept: FAMILY MEDICINE | Facility: CLINIC | Age: 59
End: 2019-09-17
Payer: COMMERCIAL

## 2019-09-17 DIAGNOSIS — Z12.4 SCREENING FOR CERVICAL CANCER: ICD-10-CM

## 2019-09-17 DIAGNOSIS — Z00.00 WELL ADULT EXAM: Primary | ICD-10-CM

## 2019-09-17 DIAGNOSIS — R06.2 WHEEZING: ICD-10-CM

## 2019-09-17 DIAGNOSIS — R05.9 COUGH: ICD-10-CM

## 2019-09-17 DIAGNOSIS — I10 ESSENTIAL HYPERTENSION: ICD-10-CM

## 2019-09-17 DIAGNOSIS — I10 BENIGN ESSENTIAL HYPERTENSION: ICD-10-CM

## 2019-09-17 DIAGNOSIS — F33.41 RECURRENT MAJOR DEPRESSIVE DISORDER, IN PARTIAL REMISSION (H): ICD-10-CM

## 2019-09-17 DIAGNOSIS — K21.00 GASTROESOPHAGEAL REFLUX DISEASE WITH ESOPHAGITIS: ICD-10-CM

## 2019-09-17 LAB
ERYTHROCYTE [DISTWIDTH] IN BLOOD BY AUTOMATED COUNT: 13.1 % (ref 10–15)
HCT VFR BLD AUTO: 40.6 % (ref 35–47)
HGB BLD-MCNC: 13.5 G/DL (ref 11.7–15.7)
MCH RBC QN AUTO: 31.8 PG (ref 26.5–33)
MCHC RBC AUTO-ENTMCNC: 33.3 G/DL (ref 31.5–36.5)
MCV RBC AUTO: 96 FL (ref 78–100)
PLATELET # BLD AUTO: 252 10E9/L (ref 150–450)
RBC # BLD AUTO: 4.24 10E12/L (ref 3.8–5.2)
WBC # BLD AUTO: 6.2 10E9/L (ref 4–11)

## 2019-09-17 PROCEDURE — 99396 PREV VISIT EST AGE 40-64: CPT | Performed by: FAMILY MEDICINE

## 2019-09-17 PROCEDURE — G0476 HPV COMBO ASSAY CA SCREEN: HCPCS | Performed by: FAMILY MEDICINE

## 2019-09-17 PROCEDURE — 80053 COMPREHEN METABOLIC PANEL: CPT | Performed by: FAMILY MEDICINE

## 2019-09-17 PROCEDURE — G0145 SCR C/V CYTO,THINLAYER,RESCR: HCPCS | Performed by: FAMILY MEDICINE

## 2019-09-17 PROCEDURE — 83721 ASSAY OF BLOOD LIPOPROTEIN: CPT | Performed by: FAMILY MEDICINE

## 2019-09-17 PROCEDURE — 36415 COLL VENOUS BLD VENIPUNCTURE: CPT | Performed by: FAMILY MEDICINE

## 2019-09-17 PROCEDURE — 85027 COMPLETE CBC AUTOMATED: CPT | Performed by: FAMILY MEDICINE

## 2019-09-17 PROCEDURE — 84443 ASSAY THYROID STIM HORMONE: CPT | Performed by: FAMILY MEDICINE

## 2019-09-17 RX ORDER — PAROXETINE 20 MG/1
TABLET, FILM COATED ORAL
Qty: 90 TABLET | Refills: 3 | Status: SHIPPED | OUTPATIENT
Start: 2019-09-17 | End: 2020-04-23

## 2019-09-17 RX ORDER — ALBUTEROL SULFATE 90 UG/1
2 AEROSOL, METERED RESPIRATORY (INHALATION) EVERY 4 HOURS PRN
Qty: 8.5 G | Refills: 1 | Status: SHIPPED | OUTPATIENT
Start: 2019-09-17 | End: 2019-12-11

## 2019-09-17 RX ORDER — LOSARTAN POTASSIUM 50 MG/1
50 TABLET ORAL DAILY
Qty: 90 TABLET | Refills: 3 | Status: SHIPPED | OUTPATIENT
Start: 2019-09-17 | End: 2020-12-07

## 2019-09-17 RX ORDER — METOPROLOL SUCCINATE 50 MG/1
50 TABLET, EXTENDED RELEASE ORAL DAILY
Qty: 90 TABLET | Refills: 3 | Status: SHIPPED | OUTPATIENT
Start: 2019-09-17 | End: 2020-04-23

## 2019-09-17 SDOH — ECONOMIC STABILITY: FOOD INSECURITY: WITHIN THE PAST 12 MONTHS, YOU WORRIED THAT YOUR FOOD WOULD RUN OUT BEFORE YOU GOT MONEY TO BUY MORE.: NEVER TRUE

## 2019-09-17 SDOH — SOCIAL STABILITY: SOCIAL NETWORK: ARE YOU MARRIED, WIDOWED, DIVORCED, SEPARATED, NEVER MARRIED, OR LIVING WITH A PARTNER?: NEVER MARRIED

## 2019-09-17 SDOH — SOCIAL STABILITY: SOCIAL NETWORK: HOW OFTEN DO YOU ATTEND CHURCH OR RELIGIOUS SERVICES?: NEVER

## 2019-09-17 SDOH — ECONOMIC STABILITY: TRANSPORTATION INSECURITY
IN THE PAST 12 MONTHS, HAS THE LACK OF TRANSPORTATION KEPT YOU FROM MEDICAL APPOINTMENTS OR FROM GETTING MEDICATIONS?: NO

## 2019-09-17 SDOH — SOCIAL STABILITY: SOCIAL NETWORK
IN A TYPICAL WEEK, HOW MANY TIMES DO YOU TALK ON THE PHONE WITH FAMILY, FRIENDS, OR NEIGHBORS?: MORE THAN THREE TIMES A WEEK

## 2019-09-17 SDOH — ECONOMIC STABILITY: FOOD INSECURITY: WITHIN THE PAST 12 MONTHS, THE FOOD YOU BOUGHT JUST DIDN'T LAST AND YOU DIDN'T HAVE MONEY TO GET MORE.: NEVER TRUE

## 2019-09-17 SDOH — HEALTH STABILITY: MENTAL HEALTH
STRESS IS WHEN SOMEONE FEELS TENSE, NERVOUS, ANXIOUS, OR CAN'T SLEEP AT NIGHT BECAUSE THEIR MIND IS TROUBLED. HOW STRESSED ARE YOU?: NOT AT ALL

## 2019-09-17 SDOH — HEALTH STABILITY: MENTAL HEALTH: HOW OFTEN DO YOU HAVE A DRINK CONTAINING ALCOHOL?: 2-3 TIMES A WEEK

## 2019-09-17 SDOH — SOCIAL STABILITY: SOCIAL NETWORK: HOW OFTEN DO YOU ATTENT MEETINGS OF THE CLUB OR ORGANIZATION YOU BELONG TO?: NEVER

## 2019-09-17 SDOH — HEALTH STABILITY: PHYSICAL HEALTH: ON AVERAGE, HOW MANY MINUTES DO YOU ENGAGE IN EXERCISE AT THIS LEVEL?: 40 MIN

## 2019-09-17 SDOH — SOCIAL STABILITY: SOCIAL NETWORK: HOW OFTEN DO YOU GET TOGETHER WITH FRIENDS OR RELATIVES?: ONCE A WEEK

## 2019-09-17 SDOH — HEALTH STABILITY: PHYSICAL HEALTH: ON AVERAGE, HOW MANY DAYS PER WEEK DO YOU ENGAGE IN MODERATE TO STRENUOUS EXERCISE (LIKE A BRISK WALK)?: 6 DAYS

## 2019-09-17 SDOH — SOCIAL STABILITY: SOCIAL NETWORK
DO YOU BELONG TO ANY CLUBS OR ORGANIZATIONS SUCH AS CHURCH GROUPS UNIONS, FRATERNAL OR ATHLETIC GROUPS, OR SCHOOL GROUPS?: NO

## 2019-09-17 ASSESSMENT — ENCOUNTER SYMPTOMS
WEAKNESS: 0
EYE PAIN: 0
COUGH: 1
PARESTHESIAS: 0
ABDOMINAL PAIN: 0
NERVOUS/ANXIOUS: 1
DIARRHEA: 0
ARTHRALGIAS: 0
FEVER: 0
CHILLS: 0
CONSTIPATION: 0
SHORTNESS OF BREATH: 0
MYALGIAS: 0
SORE THROAT: 1
HEADACHES: 0
HEMATOCHEZIA: 0
BREAST MASS: 0
HEARTBURN: 0
DYSURIA: 0
FREQUENCY: 0
JOINT SWELLING: 0
DIZZINESS: 0
NAUSEA: 0
HEMATURIA: 0
PALPITATIONS: 0

## 2019-09-17 ASSESSMENT — ANXIETY QUESTIONNAIRES
7. FEELING AFRAID AS IF SOMETHING AWFUL MIGHT HAPPEN: SEVERAL DAYS
7. FEELING AFRAID AS IF SOMETHING AWFUL MIGHT HAPPEN: SEVERAL DAYS
3. WORRYING TOO MUCH ABOUT DIFFERENT THINGS: MORE THAN HALF THE DAYS
1. FEELING NERVOUS, ANXIOUS, OR ON EDGE: MORE THAN HALF THE DAYS
6. BECOMING EASILY ANNOYED OR IRRITABLE: SEVERAL DAYS
GAD7 TOTAL SCORE: 10
2. NOT BEING ABLE TO STOP OR CONTROL WORRYING: MORE THAN HALF THE DAYS
GAD7 TOTAL SCORE: 10
5. BEING SO RESTLESS THAT IT IS HARD TO SIT STILL: NOT AT ALL
GAD7 TOTAL SCORE: 10
4. TROUBLE RELAXING: MORE THAN HALF THE DAYS

## 2019-09-17 ASSESSMENT — PATIENT HEALTH QUESTIONNAIRE - PHQ9
10. IF YOU CHECKED OFF ANY PROBLEMS, HOW DIFFICULT HAVE THESE PROBLEMS MADE IT FOR YOU TO DO YOUR WORK, TAKE CARE OF THINGS AT HOME, OR GET ALONG WITH OTHER PEOPLE: SOMEWHAT DIFFICULT
SUM OF ALL RESPONSES TO PHQ QUESTIONS 1-9: 4
SUM OF ALL RESPONSES TO PHQ QUESTIONS 1-9: 4

## 2019-09-17 ASSESSMENT — MIFFLIN-ST. JEOR: SCORE: 1306.26

## 2019-09-17 NOTE — PROGRESS NOTES
SUBJECTIVE:   CC: Miguelina Soria is an 59 year old woman who presents for preventive health visit.     Healthy Habits:     Getting at least 3 servings of Calcium per day:  NO    Bi-annual eye exam:  Yes    Dental care twice a year:  NO    Sleep apnea or symptoms of sleep apnea:  None    Diet:  Regular (no restrictions)    Frequency of exercise:  6-7 days/week    Duration of exercise:  30-45 minutes    Taking medications regularly:  Yes    Medication side effects:  None    PHQ-2 Total Score: 2    Additional concerns today:  No      Weight is up, went back to smoking the past three months         Today's PHQ-2 Score:   PHQ-2 (  [employer]) 2019   Q1: Little interest or pleasure in doing things 1   Q2: Feeling down, depressed or hopeless 1   PHQ-2 Score 2   Q1: Little interest or pleasure in doing things Several days   Q2: Feeling down, depressed or hopeless Several days   PHQ-2 Score 2       Abuse: Current or Past(Physical, Sexual or Emotional)- No  Do you feel safe in your environment? Yes    Social History     Tobacco Use     Smoking status: Former Smoker     Packs/day: 0.50     Years: 40.00     Pack years: 20.00     Types: Cigarettes     Last attempt to quit: 2012     Years since quittin.0     Smokeless tobacco: Never Used   Substance Use Topics     Alcohol use: No     Frequency: 2-3 times a week     Comment: Quit on 2007         Alcohol Use 2019   Prescreen: >3 drinks/day or >7 drinks/week? Yes   Prescreen: >3 drinks/day or >7 drinks/week? -   AUDIT SCORE  3       Reviewed orders with patient.  Reviewed health maintenance and updated orders accordingly - Yes  BP Readings from Last 3 Encounters:   19 (!) 138/100   19 (!) 140/76   19 (!) 164/84    Wt Readings from Last 3 Encounters:   19 74.4 kg (164 lb 1.6 oz)   19 73.9 kg (163 lb)   19 74.8 kg (165 lb)                    Mammogram not appropriate for this patient based on age.    Pertinent  mammograms are reviewed under the imaging tab.  History of abnormal Pap smear: NO - age 30- 65 PAP every 3 years recommended  PAP / HPV Latest Ref Rng & Units 9/15/2015 3/14/2012 10/5/2009   PAP - NIL NIL NIL   HPV 16 DNA NEG Negative - -   HPV 18 DNA NEG Negative - -   OTHER HR HPV NEG Negative - -     Reviewed and updated as needed this visit by clinical staff  Tobacco  Allergies  Med Hx  Surg Hx  Fam Hx  Soc Hx        Reviewed and updated as needed this visit by Provider            Review of Systems   Constitutional: Negative for chills and fever.   HENT: Positive for sore throat. Negative for congestion, ear pain and hearing loss.    Eyes: Positive for visual disturbance. Negative for pain.   Respiratory: Positive for cough. Negative for shortness of breath.    Cardiovascular: Negative for chest pain, palpitations and peripheral edema.   Gastrointestinal: Negative for abdominal pain, constipation, diarrhea, heartburn, hematochezia and nausea.   Breasts:  Negative for tenderness, breast mass and discharge.   Genitourinary: Negative for dysuria, frequency, genital sores, hematuria, pelvic pain, urgency, vaginal bleeding and vaginal discharge.   Musculoskeletal: Negative for arthralgias, joint swelling and myalgias.   Skin: Negative for rash.   Neurological: Negative for dizziness, weakness, headaches and paresthesias.   Psychiatric/Behavioral: Negative for mood changes. The patient is nervous/anxious.      CONSTITUTIONAL: NEGATIVE for fever, chills, change in weight  INTEGUMENTARY/SKIN: NEGATIVE for worrisome rashes, moles or lesions  EYES: NEGATIVE for vision changes or irritation  ENT: NEGATIVE for ear, mouth and throat problems  RESP: NEGATIVE for significant cough or SOB  BREAST: NEGATIVE for masses, tenderness or discharge  CV: NEGATIVE for chest pain, palpitations or peripheral edema  GI: NEGATIVE for nausea, abdominal pain, heartburn, or change in bowel habits  : NEGATIVE for unusual urinary or  vaginal symptoms. No vaginal bleeding.  MUSCULOSKELETAL: NEGATIVE for significant arthralgias or myalgia  NEURO: NEGATIVE for weakness, dizziness or paresthesias  PSYCHIATRIC: NEGATIVE for changes in mood or affect      OBJECTIVE:   LMP 01/01/2010   Physical Exam  GENERAL: healthy, alert and no distress  EYES: Eyes grossly normal to inspection, PERRL and conjunctivae and sclerae normal  HENT: ear canals and TM's normal, nose and mouth without ulcers or lesions  NECK: no adenopathy, no asymmetry, masses, or scars and thyroid normal to palpation  RESP: lungs clear to auscultation - no rales, rhonchi or wheezes  BREAST: normal without masses, tenderness or nipple discharge and no palpable axillary masses or adenopathy  CV: regular rate and rhythm, normal S1 S2, no S3 or S4, no murmur, click or rub, no peripheral edema and peripheral pulses strong  ABDOMEN: soft, nontender, no hepatosplenomegaly, no masses and bowel sounds normal  MS: no gross musculoskeletal defects noted, no edema  SKIN: no suspicious lesions or rashes  NEURO: Normal strength and tone, mentation intact and speech normal  PSYCH: mentation appears normal, affect normal/bright    Diagnostic Test Results:  Labs reviewed in Epic    ASSESSMENT/PLAN:     (Z00.00) Well adult exam  (primary encounter diagnosis)  Comment:   Plan: TSH with free T4 reflex, Comprehensive         metabolic panel, LDL cholesterol direct, CBC         with platelets, Pap imaged thin layer screen         with HPV - recommended age 30 - 65 years         (select HPV order below)            (I10) Benign essential hypertension  Comment:   Plan: TSH with free T4 reflex, Comprehensive         metabolic panel            (R05) Cough  Comment:   Plan: albuterol (PROAIR HFA/PROVENTIL HFA/VENTOLIN         HFA) 108 (90 Base) MCG/ACT inhaler        Seasonal asthma, and tobacco: meds     (R06.2) Wheezing  Comment:   Plan: albuterol (PROAIR HFA/PROVENTIL HFA/VENTOLIN         HFA) 108 (90 Base)  "MCG/ACT inhaler            (I10) Essential hypertension  Comment:   Plan: metoprolol succinate ER (TOPROL-XL) 50 MG 24 hr        tablet, losartan (COZAAR) 50 MG tablet        Monitor consider med upgrade    (K21.0) Gastroesophageal reflux disease with esophagitis  Comment:   Plan: omeprazole (PRILOSEC) 20 MG DR capsule        meds effective     (F33.41) Recurrent major depressive disorder, in partial remission (H)  Comment:   Plan: PARoxetine (PAXIL) 20 MG tablet        Working, feeling positive     (Z12.4) Screening for cervical cancer  Comment:   Plan: Pap imaged thin layer screen with HPV -         recommended age 30 - 65 years (select HPV order        below)        Pending, prior normal      COUNSELING:  Reviewed preventive health counseling, as reflected in patient instructions       Healthy diet/nutrition       Vision screening    Estimated body mass index is 27.12 kg/m  as calculated from the following:    Height as of 8/21/18: 1.651 m (5' 5\").    Weight as of 9/4/19: 73.9 kg (163 lb).         reports that she quit smoking about 7 years ago. Her smoking use included cigarettes. She has a 20.00 pack-year smoking history. She has never used smokeless tobacco. three months ago went back to cigarettes, plans to quit again      Counseling Resources:  ATP IV Guidelines  Pooled Cohorts Equation Calculator  Breast Cancer Risk Calculator  FRAX Risk Assessment  ICSI Preventive Guidelines  Dietary Guidelines for Americans, 2010  Nimbula's MyPlate  ASA Prophylaxis  Lung CA Screening    Jose A Davila MD  [facility]  Answers for HPI/ROS submitted by the patient on 9/17/2019   Annual Exam:  If you checked off any problems, how difficult have these problems made it for you to do your work, take care of things at home, or get along with other people?: Somewhat difficult  PHQ9 TOTAL SCORE: 4  ORTIZ 7 TOTAL SCORE: 10    "

## 2019-09-18 VITALS
RESPIRATION RATE: 14 BRPM | HEIGHT: 65 IN | DIASTOLIC BLOOD PRESSURE: 82 MMHG | TEMPERATURE: 98.3 F | WEIGHT: 164.1 LBS | SYSTOLIC BLOOD PRESSURE: 138 MMHG | HEART RATE: 69 BPM | BODY MASS INDEX: 27.34 KG/M2 | OXYGEN SATURATION: 97 %

## 2019-09-18 LAB
ALBUMIN SERPL-MCNC: 4.1 G/DL (ref 3.4–5)
ALP SERPL-CCNC: 91 U/L (ref 40–150)
ALT SERPL W P-5'-P-CCNC: 18 U/L (ref 0–50)
ANION GAP SERPL CALCULATED.3IONS-SCNC: 10 MMOL/L (ref 3–14)
AST SERPL W P-5'-P-CCNC: 15 U/L (ref 0–45)
BILIRUB SERPL-MCNC: 0.6 MG/DL (ref 0.2–1.3)
BUN SERPL-MCNC: 14 MG/DL (ref 7–30)
CALCIUM SERPL-MCNC: 9.5 MG/DL (ref 8.5–10.1)
CHLORIDE SERPL-SCNC: 104 MMOL/L (ref 94–109)
CO2 SERPL-SCNC: 22 MMOL/L (ref 20–32)
CREAT SERPL-MCNC: 0.56 MG/DL (ref 0.52–1.04)
GFR SERPL CREATININE-BSD FRML MDRD: >90 ML/MIN/{1.73_M2}
GLUCOSE SERPL-MCNC: 109 MG/DL (ref 70–99)
LDLC SERPL DIRECT ASSAY-MCNC: 197 MG/DL
POTASSIUM SERPL-SCNC: 3.8 MMOL/L (ref 3.4–5.3)
PROT SERPL-MCNC: 7.4 G/DL (ref 6.8–8.8)
SODIUM SERPL-SCNC: 136 MMOL/L (ref 133–144)
TSH SERPL DL<=0.005 MIU/L-ACNC: 0.67 MU/L (ref 0.4–4)

## 2019-09-18 ASSESSMENT — ANXIETY QUESTIONNAIRES: GAD7 TOTAL SCORE: 10

## 2019-09-18 ASSESSMENT — PATIENT HEALTH QUESTIONNAIRE - PHQ9: SUM OF ALL RESPONSES TO PHQ QUESTIONS 1-9: 4

## 2019-09-20 LAB
COPATH REPORT: NORMAL
PAP: NORMAL

## 2019-09-23 LAB
FINAL DIAGNOSIS: NORMAL
HPV HR 12 DNA CVX QL NAA+PROBE: NEGATIVE
HPV16 DNA SPEC QL NAA+PROBE: NEGATIVE
HPV18 DNA SPEC QL NAA+PROBE: NEGATIVE
SPECIMEN DESCRIPTION: NORMAL
SPECIMEN SOURCE CVX/VAG CYTO: NORMAL

## 2019-09-27 DIAGNOSIS — E78.5 HYPERLIPIDEMIA LDL GOAL <100: Primary | ICD-10-CM

## 2019-09-27 RX ORDER — ROSUVASTATIN CALCIUM 20 MG/1
20 TABLET, COATED ORAL DAILY
Qty: 30 TABLET | Refills: 3 | Status: SHIPPED | OUTPATIENT
Start: 2019-09-27 | End: 2019-10-11

## 2019-10-11 ENCOUNTER — TELEPHONE (OUTPATIENT)
Dept: FAMILY MEDICINE | Facility: CLINIC | Age: 59
End: 2019-10-11

## 2019-10-11 DIAGNOSIS — E78.5 HYPERLIPIDEMIA LDL GOAL <100: ICD-10-CM

## 2019-10-11 RX ORDER — ROSUVASTATIN CALCIUM 20 MG/1
20 TABLET, COATED ORAL DAILY
Status: SHIPPED
Start: 2019-10-11 | End: 2019-10-11

## 2019-10-11 NOTE — TELEPHONE ENCOUNTER
LMOVM informing pt, sent pharmacy note, updated medication list  Yudith Lujan RN, BSN  Message handled by Nurse Triage.

## 2019-10-11 NOTE — TELEPHONE ENCOUNTER
Pt calls, starting crestor 9/26/19, Wakes up every 2 hours with night terrors and muscle cramps, stopped 2 -3 days ago and symptoms resolved, feels much better, does not want to take crestor, routed to MARY ANGELES MD please advise, route to inform pt, may LMOVM    Recent Labs   Lab Test 09/17/19  1454 02/12/18  1139 09/06/16  1153 09/15/15  1106 11/25/13  1224   CHOL  --  325* 293* 236* 281*   HDL  --  61 71 60 65   * 221* 192* 142* 154*   TRIG  --  215* 149 172* 311*   CHOLHDLRATIO  --   --   --  3.9 4.3     Lab Results   Component Value Date    AST 15 09/17/2019     Lab Results   Component Value Date    ALT 18 09/17/2019     Telephone Information:   Mobile [phone]       Yudith Lujan RN, BSN  Message handled by Nurse Triage.

## 2019-10-28 NOTE — TELEPHONE ENCOUNTER
Pt calls, informs [facility] does not have ambien rx, [facility], they have rx, will cancel, [facility]  Message handled by Nurse Triage.     no

## 2019-11-07 ENCOUNTER — HEALTH MAINTENANCE LETTER (OUTPATIENT)
Age: 59
End: 2019-11-07

## 2019-11-07 DIAGNOSIS — I10 ESSENTIAL HYPERTENSION: ICD-10-CM

## 2019-11-07 DIAGNOSIS — G47.9 SLEEP DISORDER: ICD-10-CM

## 2019-11-08 RX ORDER — METOPROLOL SUCCINATE 50 MG/1
TABLET, EXTENDED RELEASE ORAL
Qty: 90 TABLET | Refills: 0 | Status: SHIPPED | OUTPATIENT
Start: 2019-11-08 | End: 2020-04-28

## 2019-11-08 RX ORDER — ZOLPIDEM TARTRATE 5 MG/1
TABLET ORAL
Qty: 30 TABLET | Refills: 1 | Status: SHIPPED | OUTPATIENT
Start: 2019-11-08 | End: 2020-01-03

## 2019-11-08 NOTE — TELEPHONE ENCOUNTER
"Requested Prescriptions   Pending Prescriptions Disp Refills     metoprolol succinate ER (TOPROL-XL) 50 MG 24 hr tablet [Pharmacy Med Name: METOPROLOL ER SUCCINATE 50MG TABS] 90 tablet 0     Sig: TAKE 1 TABLET(50 MG) BY MOUTH DAILY       Last Written Prescription Date:  9/17/19  Last Fill Quantity: 90 tablet,  # refills: 3   Last office visit: 9/17/2019 with prescribing provider:   Lola Asher Office Visit:        Beta-Blockers Protocol Passed - 11/7/2019 10:37 AM        Passed - Blood pressure under 140/90 in past 12 months     BP Readings from Last 3 Encounters:   09/17/19 138/82   09/04/19 (!) 140/76   08/19/19 (!) 164/84                 Passed - Patient is age 6 or older        Passed - Recent (12 mo) or future (30 days) visit within the authorizing provider's specialty     Patient has had an office visit with the authorizing provider or a provider within the authorizing providers department within the previous 12 mos or has a future within next 30 days. See \"Patient Info\" tab in inbasket, or \"Choose Columns\" in Meds & Orders section of the refill encounter.              Passed - Medication is active on med list        zolpidem (AMBIEN) 5 MG tablet [Pharmacy Med Name: ZOLPIDEM 5MG TABLETS] 30 tablet      Sig: TAKE 1 TABLET BY MOUTH EVERY NIGHT AT BEDTIME AS NEEDED FOR SLEEP       There is no refill protocol information for this order         zolpidem (AMBIEN) 5 MG tablet   Last Written Prescription Date:  9/13/19  Last Fill Quantity: 30 tablet,   # refills: 1  Last Office Visit: 9/17/19 Lola  Future Office visit:       Routing refill request to provider for review/approval because:  Drug not on the G, P or [facility] refill protocol or controlled substance         "

## 2019-11-08 NOTE — TELEPHONE ENCOUNTER
"Requested Prescriptions   Pending Prescriptions Disp Refills     metoprolol succinate ER (TOPROL-XL) 50 MG 24 hr tablet [Pharmacy Med Name: METOPROLOL ER SUCCINATE 50MG TABS] 90 tablet 0     Sig: TAKE 1 TABLET(50 MG) BY MOUTH DAILY       Beta-Blockers Protocol Passed - 11/8/2019  9:32 AM        Passed - Blood pressure under 140/90 in past 12 months     BP Readings from Last 3 Encounters:   09/17/19 138/82   09/04/19 (!) 140/76   08/19/19 (!) 164/84                 Passed - Patient is age 6 or older        Passed - Recent (12 mo) or future (30 days) visit within the authorizing provider's specialty     Patient has had an office visit with the authorizing provider or a provider within the authorizing providers department within the previous 12 mos or has a future within next 30 days. See \"Patient Info\" tab in inbasket, or \"Choose Columns\" in Meds & Orders section of the refill encounter.              Passed - Medication is active on med list        zolpidem (AMBIEN) 5 MG tablet [Pharmacy Med Name: ZOLPIDEM 5MG TABLETS] 30 tablet      Sig: TAKE 1 TABLET BY MOUTH EVERY NIGHT AT BEDTIME AS NEEDED FOR SLEEP       There is no refill protocol information for this order        Prescription approved per [facility] [place] [place] Refill Protocol for Toprol XL. Ambien sent to provider for approval or denial.  Olesya Lowe RN on 11/8/2019 at 1:16 PM.  "

## 2019-12-11 DIAGNOSIS — R06.2 WHEEZING: ICD-10-CM

## 2019-12-11 DIAGNOSIS — R05.9 COUGH: ICD-10-CM

## 2019-12-12 RX ORDER — ALBUTEROL SULFATE 90 UG/1
AEROSOL, METERED RESPIRATORY (INHALATION)
Qty: 18 G | Refills: 0 | Status: SHIPPED | OUTPATIENT
Start: 2019-12-12 | End: 2020-01-08

## 2019-12-12 NOTE — TELEPHONE ENCOUNTER
Routing refill request to provider for review/approval because:  Medication is not being used for asthma. Fails for associated diagnosis.    Samina Barry RN, [facility]

## 2020-01-01 NOTE — MR AVS SNAPSHOT
After Visit Summary   8/21/2018    Miguelina Soria    MRN: [id number]           Patient Information     Date Of Birth          1960        Visit Information        Provider Department      8/21/2018 1:15 PM Julian Haskins MD [facility]        Today's Diagnoses     Does not have health insurance    -  1    Sleep disorder        Other chronic pain        Recurrent major depressive disorder, in partial remission (H)        Essential hypertension        Rosacea           Follow-ups after your visit        Additional Services     CARE COORDINATION REFERRAL       Services are provided by a Care Coordinator for people with complex needs such as: medical, social, or financial troubles.  The Care Coordinator works with the patient and their Primary Care Provider to determine health goals, obtain resources, achieve outcomes, and develop care plans that help coordinate the patient's care.     Reason for Referral: Financial Support: Insurance    Additional pertinent details:  PAP due    Clinical Staff have discussed the Care Coordination Referral with the patient and/or caregiver: yes                  Follow-up notes from your care team     Return in about 6 weeks (around 10/2/2018) for Physical Exam.      Who to contact     If you have questions or need follow up information about today's clinic visit or your schedule please contact [facility] directly at [phone].  Normal or non-critical lab and imaging results will be communicated to you by MyChart, letter or phone within 4 business days after the clinic has received the results. If you do not hear from us within 7 days, please contact the clinic through MyChart or phone. If you have a critical or abnormal lab result, we will notify you by phone as soon as possible.  Submit refill requests through [employer] or call your pharmacy and they will forward the refill request to us. Please allow 3 business days for your  Called mom and advised  forms were signed at ready for  at the front lobby desk.  She had no further questions or concerns.    "refill to be completed.          Additional Information About Your Visit        [employer] Information     [employer] gives you secure access to your electronic health record. If you see a primary care provider, you can also send messages to your care team and make appointments. If you have questions, please call your primary care clinic.  If you do not have a primary care provider, please call [phone] and they will assist you.        Care EveryWhere ID     This is your Care EveryWhere ID. This could be used by other organizations to access your [place] medical records  [id number]        Your Vitals Were     Pulse Temperature Respirations Height Last Period Breastfeeding?    76 98.6  F (37  C) (Oral) 18 5' 5\" (1.651 m) 01/01/2010 No    BMI (Body Mass Index)                   26.29 kg/m2            Blood Pressure from Last 3 Encounters:   08/21/18 170/90   02/12/18 138/78   11/29/17 (!) 150/100    Weight from Last 3 Encounters:   08/21/18 158 lb (71.7 kg)   02/12/18 155 lb (70.3 kg)   11/29/17 154 lb 14.4 oz (70.3 kg)              We Performed the Following     CARE COORDINATION REFERRAL          Today's Medication Changes          These changes are accurate as of 8/21/18 11:59 PM.  If you have any questions, ask your nurse or doctor.               Start taking these medicines.        Dose/Directions    metroNIDAZOLE 0.75 % topical gel   Commonly known as:  METROGEL   Used for:  Rosacea   Started by:  Julian Haskins MD        Apply topically 2 times daily   Quantity:  45 g   Refills:  1         These medicines have changed or have updated prescriptions.        Dose/Directions    metoprolol succinate 50 MG 24 hr tablet   Commonly known as:  TOPROL-XL   This may have changed:    - medication strength  - See the new instructions.   Used for:  Essential hypertension   Changed by:  Julian Haskins MD        Dose:  50 mg   Take 1 tablet (50 mg) by mouth daily   Quantity:  90 tablet   Refills:  0       traMADol 50 MG " tablet   Commonly known as:  ULTRAM   This may have changed:  additional instructions   Used for:  Other chronic pain   Changed by:  Julian Haskins MD        Dose:  100 mg   Take 2 tablets (100 mg) by mouth 3 times daily as needed for severe pain   Quantity:  180 tablet   Refills:  0       zolpidem 5 MG tablet   Commonly known as:  AMBIEN   This may have changed:  additional instructions   Used for:  Sleep disorder   Changed by:  Julian Haskins MD        Dose:  5 mg   Take 1 tablet (5 mg) by mouth At Bedtime   Quantity:  30 tablet   Refills:  0            Where to get your medicines      These medications were sent to [facility] [id number]  [place] - [address] AT [facility]  [address] [place] [address]     Phone:  [phone]     metoprolol succinate 50 MG 24 hr tablet    metroNIDAZOLE 0.75 % topical gel    PARoxetine 20 MG tablet         Some of these will need a paper prescription and others can be bought over the counter.  Ask your nurse if you have questions.     Bring a paper prescription for each of these medications     traMADol 50 MG tablet    zolpidem 5 MG tablet               Information about OPIOIDS     PRESCRIPTION OPIOIDS: WHAT YOU NEED TO KNOW   We gave you an opioid (narcotic) pain medicine. It is important to manage your pain, but opioids are not always the best choice. You should first try all the other options your care team gave you. Take this medicine for as short a time (and as few doses) as possible.    Some activities can increase your pain, such as bandage changes or therapy sessions. It may help to take your pain medicine 30 to 60 minutes before these activities. Reduce your stress by getting enough sleep, working on hobbies you enjoy and practicing relaxation or meditation. Talk to your care team about ways to manage your pain beyond prescription opioids.    These medicines have risks:    DO NOT drive when on new or higher doses of pain medicine. These  medicines can affect your alertness and reaction times, and you could be arrested for driving under the influence (DUI). If you need to use opioids long-term, talk to your care team about driving.    DO NOT operate heavy machinery    DO NOT do any other dangerous activities while taking these medicines.    DO NOT drink any alcohol while taking these medicines.     If the opioid prescribed includes acetaminophen, DO NOT take with any other medicines that contain acetaminophen. Read all labels carefully. Look for the word  acetaminophen  or  Tylenol.  Ask your pharmacist if you have questions or are unsure.    You can get addicted to pain medicines, especially if you have a history of addiction (chemical, alcohol or substance dependence). Talk to your care team about ways to reduce this risk.    All opioids tend to cause constipation. Drink plenty of water and eat foods that have a lot of fiber, such as fruits, vegetables, prune juice, apple juice and high-fiber cereal. Take a laxative (Miralax, milk of magnesia, Colace, Senna) if you don t move your bowels at least every other day. Other side effects include upset stomach, sleepiness, dizziness, throwing up, tolerance (needing more of the medicine to have the same effect), physical dependence and slowed breathing.    Store your pills in a secure place, locked if possible. We will not replace any lost or stolen medicine. If you don t finish your medicine, please throw away (dispose) as directed by your pharmacist. The [place] Pollution Control Agency has more information about safe disposal: [email or web address]-medications         Primary Care Provider Office Phone # Fax #    Jose A Davila [phone] [address] [facility] [address]        Equal Access to Services     DAMIEN HOUGH : vika Dhaliwal qaybta kaalmada adeegyada, waxay idiin hayaan adeeg kharash  lalaura fierro. So [facility] [phone].    ATENCIÓN: Si habla salma, tiene a roberts disposición servicios gratuitos de asistencia lingüística. Mendel tenorio [phone].    We comply with applicable federal civil rights laws and [place] laws. We do not discriminate on the basis of race, color, national origin, age, disability, sex, sexual orientation, or gender identity.            Thank you!     Thank you for choosing [facility]  for your care. Our goal is always to provide you with excellent care. Hearing back from our patients is one way we can continue to improve our services. Please take a few minutes to complete the written survey that you may receive in the mail after your visit with us. Thank you!             Your Updated Medication List - Protect others around you: Learn how to safely use, store and throw away your medicines at www.disposemymeds.org.          This list is accurate as of 8/21/18 11:59 PM.  Always use your most recent med list.                   Brand Name Dispense Instructions for use Diagnosis    metoprolol succinate 50 MG 24 hr tablet    TOPROL-XL    90 tablet    Take 1 tablet (50 mg) by mouth daily    Essential hypertension       metroNIDAZOLE 0.75 % topical gel    METROGEL    45 g    Apply topically 2 times daily    Rosacea       * omeprazole 20 MG tablet     90 tablet    Take 20 mg by mouth daily.    GERD (gastroesophageal reflux disease)       * omeprazole 20 MG CR capsule    priLOSEC    90 capsule    Take 1 capsule (20 mg) by mouth daily    Gastroesophageal reflux disease with esophagitis       PARoxetine 20 MG tablet    PAXIL    90 tablet    TAKE 1 TABLET BY MOUTH EVERY DAY WITH DINNER    Recurrent major depressive disorder, in partial remission (H)       traMADol 50 MG tablet    ULTRAM    180 tablet    Take 2 tablets (100 mg) by mouth 3 times daily as needed for severe pain    Other chronic pain       zolpidem 5 MG tablet    AMBIEN    30 tablet    Take 1 tablet (5 mg) by  mouth At Bedtime    Sleep disorder       * Notice:  This list has 2 medication(s) that are the same as other medications prescribed for you. Read the directions carefully, and ask your doctor or other care provider to review them with you.

## 2020-01-08 ENCOUNTER — TELEPHONE (OUTPATIENT)
Dept: FAMILY MEDICINE | Facility: CLINIC | Age: 60
End: 2020-01-08

## 2020-01-08 NOTE — TELEPHONE ENCOUNTER
Summary:    Patient is due/failing the following:   COLONOSCOPY and MAMMOGRAM    Reviewed:  [x] CARE EVERYWHERE  [x] LAST OV NOTE INCLUDING ENDO  [x] FYI TAB  [x] LAST PANEL ENCOUNTER  [x] FUTURE APTS  [x] MYCHART STATUS   [x] IMMUNIZATIONS  Action needed:   Patient needs referral/order: colonoscopy and mammogram    Type of outreach:    Phone, spoke to patient.  pt states she cant schedule right now because she doesnt have insurance.                                                                               Suzan Kim MA on 1/8/2020 at 1:29 PM

## 2020-01-23 ENCOUNTER — ANCILLARY PROCEDURE (OUTPATIENT)
Dept: GENERAL RADIOLOGY | Facility: CLINIC | Age: 60
End: 2020-01-23
Attending: NURSE PRACTITIONER

## 2020-01-23 ENCOUNTER — OFFICE VISIT (OUTPATIENT)
Dept: PEDIATRICS | Facility: CLINIC | Age: 60
End: 2020-01-23

## 2020-01-23 ENCOUNTER — HOSPITAL ENCOUNTER (EMERGENCY)
Facility: CLINIC | Age: 60
Discharge: HOME | End: 2020-01-23
Attending: EMERGENCY MEDICINE | Admitting: EMERGENCY MEDICINE

## 2020-01-23 VITALS
TEMPERATURE: 102.3 F | HEART RATE: 117 BPM | HEIGHT: 65 IN | OXYGEN SATURATION: 94 % | WEIGHT: 162 LBS | RESPIRATION RATE: 24 BRPM | SYSTOLIC BLOOD PRESSURE: 140 MMHG | DIASTOLIC BLOOD PRESSURE: 80 MMHG | BODY MASS INDEX: 26.99 KG/M2

## 2020-01-23 VITALS
HEART RATE: 98 BPM | RESPIRATION RATE: 24 BRPM | OXYGEN SATURATION: 92 % | SYSTOLIC BLOOD PRESSURE: 128 MMHG | TEMPERATURE: 99.8 F | DIASTOLIC BLOOD PRESSURE: 61 MMHG

## 2020-01-23 DIAGNOSIS — R05.9 COUGH: ICD-10-CM

## 2020-01-23 DIAGNOSIS — R00.0 TACHYCARDIA: ICD-10-CM

## 2020-01-23 DIAGNOSIS — R50.9 FEVER, UNSPECIFIED FEVER CAUSE: ICD-10-CM

## 2020-01-23 DIAGNOSIS — J10.1 INFLUENZA A: Primary | ICD-10-CM

## 2020-01-23 DIAGNOSIS — R79.81 BORDERLINE LOW O2 SATURATION: ICD-10-CM

## 2020-01-23 DIAGNOSIS — R06.2 WHEEZE: ICD-10-CM

## 2020-01-23 DIAGNOSIS — J10.1 INFLUENZA A: ICD-10-CM

## 2020-01-23 LAB
ANION GAP SERPL CALCULATED.3IONS-SCNC: 6 MMOL/L (ref 3–14)
BASOPHILS # BLD AUTO: 0 10E9/L (ref 0–0.2)
BASOPHILS NFR BLD AUTO: 0.4 %
BUN SERPL-MCNC: 13 MG/DL (ref 7–30)
CALCIUM SERPL-MCNC: 8.2 MG/DL (ref 8.5–10.1)
CHLORIDE SERPL-SCNC: 102 MMOL/L (ref 94–109)
CO2 SERPL-SCNC: 26 MMOL/L (ref 20–32)
CREAT SERPL-MCNC: 0.58 MG/DL (ref 0.52–1.04)
DIFFERENTIAL METHOD BLD: ABNORMAL
EOSINOPHIL # BLD AUTO: 0 10E9/L (ref 0–0.7)
EOSINOPHIL NFR BLD AUTO: 0 %
ERYTHROCYTE [DISTWIDTH] IN BLOOD BY AUTOMATED COUNT: 12.7 % (ref 10–15)
FLUAV+FLUBV AG SPEC QL: NEGATIVE
FLUAV+FLUBV AG SPEC QL: POSITIVE
GFR SERPL CREATININE-BSD FRML MDRD: >90 ML/MIN/{1.73_M2}
GLUCOSE SERPL-MCNC: 113 MG/DL (ref 70–99)
HCT VFR BLD AUTO: 37.4 % (ref 35–47)
HGB BLD-MCNC: 12.3 G/DL (ref 11.7–15.7)
IMM GRANULOCYTES # BLD: 0 10E9/L (ref 0–0.4)
IMM GRANULOCYTES NFR BLD: 0.2 %
LYMPHOCYTES # BLD AUTO: 0.5 10E9/L (ref 0.8–5.3)
LYMPHOCYTES NFR BLD AUTO: 9.9 %
MCH RBC QN AUTO: 31.6 PG (ref 26.5–33)
MCHC RBC AUTO-ENTMCNC: 32.9 G/DL (ref 31.5–36.5)
MCV RBC AUTO: 96 FL (ref 78–100)
MONOCYTES # BLD AUTO: 0.4 10E9/L (ref 0–1.3)
MONOCYTES NFR BLD AUTO: 7.3 %
NEUTROPHILS # BLD AUTO: 4.5 10E9/L (ref 1.6–8.3)
NEUTROPHILS NFR BLD AUTO: 82.2 %
NRBC # BLD AUTO: 0 10*3/UL
NRBC BLD AUTO-RTO: 0 /100
PLATELET # BLD AUTO: 179 10E9/L (ref 150–450)
POTASSIUM SERPL-SCNC: 3.2 MMOL/L (ref 3.4–5.3)
RBC # BLD AUTO: 3.89 10E12/L (ref 3.8–5.2)
SODIUM SERPL-SCNC: 134 MMOL/L (ref 133–144)
SPECIMEN SOURCE: ABNORMAL
WBC # BLD AUTO: 5.5 10E9/L (ref 4–11)

## 2020-01-23 PROCEDURE — 71046 X-RAY EXAM CHEST 2 VIEWS: CPT

## 2020-01-23 PROCEDURE — 94640 AIRWAY INHALATION TREATMENT: CPT | Performed by: NURSE PRACTITIONER

## 2020-01-23 PROCEDURE — 25800030 ZZH RX IP 258 OP 636: Performed by: EMERGENCY MEDICINE

## 2020-01-23 PROCEDURE — 96361 HYDRATE IV INFUSION ADD-ON: CPT

## 2020-01-23 PROCEDURE — 85025 COMPLETE CBC W/AUTO DIFF WBC: CPT | Performed by: EMERGENCY MEDICINE

## 2020-01-23 PROCEDURE — 87804 INFLUENZA ASSAY W/OPTIC: CPT | Performed by: NURSE PRACTITIONER

## 2020-01-23 PROCEDURE — 99214 OFFICE O/P EST MOD 30 MIN: CPT | Mod: 25 | Performed by: NURSE PRACTITIONER

## 2020-01-23 PROCEDURE — 25000132 ZZH RX MED GY IP 250 OP 250 PS 637: Performed by: EMERGENCY MEDICINE

## 2020-01-23 PROCEDURE — 99283 EMERGENCY DEPT VISIT LOW MDM: CPT | Mod: 25

## 2020-01-23 PROCEDURE — 96360 HYDRATION IV INFUSION INIT: CPT

## 2020-01-23 PROCEDURE — 80048 BASIC METABOLIC PNL TOTAL CA: CPT | Performed by: EMERGENCY MEDICINE

## 2020-01-23 RX ORDER — ALBUTEROL SULFATE 0.83 MG/ML
2.5 SOLUTION RESPIRATORY (INHALATION) EVERY 6 HOURS PRN
Qty: 75 ML | Refills: 0 | Status: SHIPPED | OUTPATIENT
Start: 2020-01-23 | End: 2020-03-12

## 2020-01-23 RX ORDER — IPRATROPIUM BROMIDE AND ALBUTEROL SULFATE 2.5; .5 MG/3ML; MG/3ML
3 SOLUTION RESPIRATORY (INHALATION) ONCE
Status: COMPLETED | OUTPATIENT
Start: 2020-01-23 | End: 2020-01-23

## 2020-01-23 RX ORDER — IBUPROFEN 200 MG
600 TABLET ORAL EVERY 6 HOURS PRN
Qty: 60 TABLET | Refills: 0 | Status: SHIPPED | OUTPATIENT
Start: 2020-01-23

## 2020-01-23 RX ORDER — ACETAMINOPHEN 325 MG/1
650 TABLET ORAL ONCE
Status: COMPLETED | OUTPATIENT
Start: 2020-01-23 | End: 2020-01-23

## 2020-01-23 RX ORDER — ALBUTEROL SULFATE 90 UG/1
2 AEROSOL, METERED RESPIRATORY (INHALATION) EVERY 6 HOURS PRN
Qty: 1 INHALER | Refills: 0 | Status: SHIPPED | OUTPATIENT
Start: 2020-01-23 | End: 2020-03-17

## 2020-01-23 RX ADMIN — IPRATROPIUM BROMIDE AND ALBUTEROL SULFATE 3 ML: 2.5; .5 SOLUTION RESPIRATORY (INHALATION) at 15:08

## 2020-01-23 RX ADMIN — SODIUM CHLORIDE 1000 ML: 9 INJECTION, SOLUTION INTRAVENOUS at 16:57

## 2020-01-23 RX ADMIN — ACETAMINOPHEN 650 MG: 325 TABLET, FILM COATED ORAL at 16:56

## 2020-01-23 ASSESSMENT — ENCOUNTER SYMPTOMS
SHORTNESS OF BREATH: 1
COUGH: 1
FEVER: 1
CHILLS: 1

## 2020-01-23 ASSESSMENT — MIFFLIN-ST. JEOR: SCORE: 1296.74

## 2020-01-23 NOTE — ED TRIAGE NOTES
Pt went to clinic today for respiratory illness. Found to be hypoxic in upper 80s on ra. Also tested positive for Influenza A. Temp 102.0. Clinic called ems.

## 2020-01-23 NOTE — ED PROVIDER NOTES
History     Chief Complaint:  Shortness of Breath    HPI   Miguelina Soria is a 59 year old female who presents to the ED for shortness of breath. The patient reports experiencing a cough for the past 4 months, and her cough was significantly worse today, which prompted her to visit urgent care. ED nurse reports that the patient was referred to the ED by urgent care for a fever of 102 degrees and pulse oximetry around 80%. The patient states that she was diagnosed today with influenza A. The patient reports experiencing shortness of breath for the past 4 days. She reports feeling fevers and chills this morning accompanying her cough and shortness of breath. She denies leg swelling. The patient reports taking Nicci-Normangee and Aleve for her symptoms without improvement.     The patient reports a history of smoking. She states that she quit a week ago, but before then was smoking roughly a pack every 3 days for 4 months; she had smoked for 20 years prior to that as well and quit in 2012. She reports a history of knee replacement surgery.    CHEST TWO VIEWS 1/23/2020 3:26 PM                                                     IMPRESSION: No focal infiltrate or consolidation to represent  pneumonia. No significant pleural fluid. Normal heart size. Normal  pulmonary vascularity. Mildly tortuous and calcified thoracic aorta.  Minimal degenerative changes thoracic spine and both shoulders. Old  healed left seventh and eighth rib fractures.  CURT L BEHRNS, MD    Allergies:  No Known Allergies    Medications:    Albuterol  Losartan  Metoprolol succinate  Omeprazole  Paroxetine  Ambien     Past Medical History:    Insomnia  GERD  Hypertension  Anxiety  Hyperlipidemia  Depression  Asthma    Past Surgical History:    Knee replacement surgery    Family History:    No past pertinent family history.    Social History:  Presents alone.  Former smoker, 20 pack years, quit 1 week ago.  History of alcohol use  Marital Status:  Single  [1]     Review of Systems   Constitutional: Positive for chills and fever.   Respiratory: Positive for cough and shortness of breath.    Cardiovascular: Negative for leg swelling.   All other systems reviewed and are negative.    Physical Exam     Patient Vitals for the past 24 hrs:   BP Temp Temp src Pulse Resp SpO2   01/23/20 1835 -- 99.8  F (37.7  C) Oral -- -- 92 %   01/23/20 1830 128/61 -- -- 98 -- 92 %   01/23/20 1825 -- -- -- -- -- 91 %   01/23/20 1820 -- -- -- -- -- 92 %   01/23/20 1815 135/73 -- -- 105 -- 93 %   01/23/20 1810 -- -- -- -- -- 92 %   01/23/20 1805 -- -- -- -- -- 91 %   01/23/20 1800 (!) 157/74 -- -- 102 -- 91 %   01/23/20 1755 -- -- -- -- -- 91 %   01/23/20 1750 -- -- -- -- -- 91 %   01/23/20 1745 (!) 146/67 -- -- 103 -- 92 %   01/23/20 1740 -- -- -- -- -- 90 %   01/23/20 1735 -- -- -- -- -- 91 %   01/23/20 1730 (!) 158/75 -- -- 107 -- 91 %   01/23/20 1725 -- -- -- -- -- 91 %   01/23/20 1720 -- -- -- -- -- 91 %   01/23/20 1715 (!) 159/81 -- -- 104 -- 92 %   01/23/20 1710 -- -- -- -- -- 90 %   01/23/20 1705 -- -- -- -- -- 92 %   01/23/20 1700 130/75 -- -- 106 -- 92 %   01/23/20 1629 (!) 140/74 102  F (38.9  C) Oral 108 24 95 %       Physical Exam  Constitutional: Vital signs reviewed as above.   Head: No external signs of trauma noted.  Eyes: Pupils are equal, round, and reactive to light.   Neck: No JVD noted  Oropharynx: MMM  Cardiovascular: Tachycardic rate, regular rhythm and normal heart sounds.  No murmur heard. Equal B/L peripheral pulses.  Pulmonary/Chest: Effort normal and breath sounds normal. No respiratory distress. Patient has no wheezes at the time of my exam. Patient has no rales.   Gastrointestinal: Soft. There is no tenderness.   Musculoskeletal/Extremities: No edema noted. Normal tone.  Neurological: Patient is alert and oriented to person, place, and time.   Skin: Skin is warm and dry. There is no diaphoresis noted.   Psychiatric: The patient appears calm.      Emergency  Department Course     Laboratory:  Laboratory findings were communicated with the patient who voiced understanding of the findings.    CBC: WBC: 5.5, HGB: 12.3, PLT: 179  BMP: Glucose 113 (H), Potassium 3.2 (L), Calcium 8.2 (L), o/w WNL (Creatinine: 0.58)     Interventions:  1656 Tylenol 650 mg PO  1657 NS 1L IV Bolus    Emergency Department Course:  Past medical records, nursing notes, and vitals reviewed.    1632 I performed an exam of the patient as documented above.     IV was inserted and blood was drawn for laboratory testing, results above.    1830 I rechecked the patient and discussed the results of her workup thus far.     Findings and plan explained to the Patient. Patient discharged home with instructions regarding supportive care, medications, and reasons to return. The importance of close follow-up was reviewed. The patient was prescribed albuterol, ibuprofen.    I personally reviewed the laboratory results with the Patient and answered all related questions prior to discharge.     Impression & Plan     Medical Decision Making:  This 59-year-old female patient presents the ED due to hypoxia and influenza A.  Please see the HPI and exam for specifics.  The patient had 4 days of shortness of breath that prompted a clinic visit.  She was found to be hypoxic with oxygen saturations in the 80s and was diagnosed with influenza A and sent here for further management.  She reportedly received a breathing treatment at the clinic before coming here and by the time of my evaluation her lungs were entirely clear.    The patient remained well on room air.  Her oxygen saturations were between 91 and 93%.  She stated that she felt much better and was even able to hold her oxygen saturations while ambulating.  We discussed her symptoms and at this point she is comfortable being discharged home.  I will encourage her to use her albuterol if she feels she needs it, stay hydrated, and use antipyretics.  She should return  to the ED if she has any worsening or concerning symptoms and especially if she notices worsening shortness of breath.  Anticipatory guidance given prior to discharge.    Diagnosis:    ICD-10-CM   1. Influenza A J10.1       Disposition:  Discharged to home.    Discharge Medications:  New Prescriptions    ALBUTEROL (PROAIR HFA/PROVENTIL HFA/VENTOLIN HFA) 108 (90 BASE) MCG/ACT INHALER    Inhale 2 puffs into the lungs every 6 hours as needed for shortness of breath / dyspnea or wheezing    IBUPROFEN (ADVIL/MOTRIN) 200 MG TABLET    Take 3 tablets (600 mg) by mouth every 6 hours as needed for mild pain or fever       Scribe Disclosure:  I, Silvio Wolfe, am serving as a scribe at 6:19 PM on 1/23/2020 to document services personally performed by Nico Peña DO based on my observations and the provider's statements to me.     I, Jitendra Davenport, am serving as a scribe on 1/23/2020 at 6:32 PM to personally document services performed by Nico Peña DO based on my observations and the provider's statements to me.      Nico Peña DO  01/23/20 [id number]

## 2020-01-23 NOTE — PROGRESS NOTES
"Subjective     Miguelina Soria is a 59 year old female who presents to clinic today for the following health issues:    HPI   Acute Illness   Acute illness concerns: cough   Onset: 1 month     Fever: not sure      Chills/Sweats: YES- both    Headache (location?): YES    Sinus Pressure:not sure     Conjunctivitis:  no    Ear Pain: no    Rhinorrhea: YES- green     Congestion: YES- chest     Sore Throat: no      Cough: YES-non-productive    Wheeze: YES    Decreased Appetite: no     Nausea: no    Vomiting: YES    Diarrhea:  no    Dysuria/Freq.: no    Fatigue/Achiness: YES    Sick/Strep Exposure: not sure       Therapies Tried and outcome: ramona seltzer and inhaler doesn't really help     Patient has hx of asthma and former smoker, here today with cough, wheeze, nasal congestion for the past 1 month. She notes her symptoms worsened about 3-4 days ago and now is febrile with increased cough, shortness of breath, wheeze, chest pain/tightness. She is not sleeping well due to cough.     Has albuterol inhaler but hasn't used since she doesn't have one/can't afford. No daily inhaler   Ate chicken soup yesterday and drinking water, voiding normally.   She did feel nauseated.      Reviewed and updated as needed this visit by Provider  Meds  Problems       Review of Systems   ROS COMP: Constitutional, HEENT, cardiovascular, pulmonary, gi and gu systems are negative, except as otherwise noted.      Objective    BP (!) 140/80 (BP Location: Right arm, Patient Position: Chair, Cuff Size: Adult Regular)   Pulse 117   Temp 102.3  F (39.1  C) (Tympanic)   Resp 24   Ht 1.645 m (5' 4.75\")   Wt 73.5 kg (162 lb)   LMP 01/01/2010   SpO2 91%   BMI 27.17 kg/m    Body mass index is 27.17 kg/m .  Physical Exam   GENERAL: alert but appears tired/ fatigued, initially difficulties with dyspnea with speaking that did improve after neb treatment  RESP: expiratory wheezes throughout, inspiratory wheezes throughout, some increased air movement " after neb but dim in bases.  CV: regularrhythm, tachycardic, normal S1 S2, no S3 or S4, no murmur, click or rub  SKIN: normal color    Diagnostic Test Results:  CXR - IMPRESSION: No focal infiltrate or consolidation to represent  pneumonia. No significant pleural fluid. Normal heart size. Normal  pulmonary vascularity. Mildly tortuous and calcified thoracic aorta.  Minimal degenerative changes thoracic spine and both shoulders. Old  healed left seventh and eighth rib fractures.      On 3 L o2 now at 93-94%    Assessment & Plan       ICD-10-CM    1. Influenza A J10.1 XR Chest 2 Views   2. Fever, unspecified fever cause R50.9 Influenza A/B antigen     XR Chest 2 Views   3. Cough R05 ipratropium - albuterol 0.5 mg/2.5 mg/3 mL (DUONEB) neb solution 3 mL     INHALATION/NEBULIZER TREATMENT, INITIAL     XR Chest 2 Views   4. Wheeze R06.2 ipratropium - albuterol 0.5 mg/2.5 mg/3 mL (DUONEB) neb solution 3 mL     INHALATION/NEBULIZER TREATMENT, INITIAL     XR Chest 2 Views   5. Borderline low O2 saturation R79.81 ipratropium - albuterol 0.5 mg/2.5 mg/3 mL (DUONEB) neb solution 3 mL     INHALATION/NEBULIZER TREATMENT, INITIAL     XR Chest 2 Views   6. Tachycardia R00.0      Patient comes in with 1 month of cough that worsened the past 4 days now with fevers, shortness of breath, and chest tightness.    Initially on exam she has some difficulties speaking due to dyspnea, wheezes throughout, tachycardic, febrile, and 02 sats 91% on room air. She tested positive for influenza A, was given a duoneb which did improve some air movement and dyspnea but was unable to maintain sats >91% without O2 and concerns for acute resp failure so she was sent to the ER for further eval. Has no hx of requiring O2.    Sent to ER via EMS for further eval/monitoring/work-up.    No follow-ups on file.    Latonya Sheldon NP  [facility]

## 2020-01-23 NOTE — ED AVS SNAPSHOT
[facility] Emergency Department  [address]  [facility] [address]  Phone:  [phone]  Fax:  [phone]                                    Miguelina Soria   MRN: [id number]    Department:  [facility] Emergency Department   Date of Visit:  1/23/2020           After Visit Summary Signature Page    I have received my discharge instructions, and my questions have been answered. I have discussed any challenges I see with this plan with the nurse or doctor.    ..........................................................................................................................................  Patient/Patient Representative Signature      ..........................................................................................................................................  Patient Representative Print Name and Relationship to Patient    ..................................................               ................................................  Date                                   Time    ..........................................................................................................................................  Reviewed by Signature/Title    ...................................................              ..............................................  Date                                               Time          22EPIC Rev 08/18

## 2020-01-23 NOTE — ED NOTES
Bed: ED19  Expected date: 1/23/20  Expected time: 4:14 PM  Means of arrival: Ambulance  Comments:  DUC 59F SOB

## 2020-01-23 NOTE — LETTER
January 23, 2020      To Whom It May Concern:      Miguelina Soria was seen in our Emergency Department today, 01/23/20.  I expect her condition to improve over the next 5-7 days.  She may return to work/school when improved.    Sincerely,        Jes Toro RN

## 2020-01-24 NOTE — DISCHARGE INSTRUCTIONS
Your clinic diagnosed you with influenza.  I am pleased that their breathing treatment seems to have improved your oxygenation.  Your heart rate has improved with IV fluids and treatment of your fever.  Your lungs are clear and your oxygen levels have stabilized in the 90's.   At this time, I believe you can be discharged.  An oxygen saturation of 92 to 93% is still okay.  I would expect this will improve as your body fights the influenza infection.  Please stay very hydrated and use Tylenol and ibuprofen for fever control.  Follow closely in the outpatient setting and return to the ED immediately if you have any worsening symptoms including concerning shortness of breath, lightheadedness, chest pain, or other symptoms that concern you.    You for allowing us to care for you today.

## 2020-01-29 ENCOUNTER — OFFICE VISIT (OUTPATIENT)
Dept: FAMILY MEDICINE | Facility: CLINIC | Age: 60
End: 2020-01-29

## 2020-01-29 VITALS
WEIGHT: 164 LBS | HEART RATE: 72 BPM | HEIGHT: 65 IN | SYSTOLIC BLOOD PRESSURE: 148 MMHG | TEMPERATURE: 98.5 F | RESPIRATION RATE: 18 BRPM | DIASTOLIC BLOOD PRESSURE: 88 MMHG | BODY MASS INDEX: 27.32 KG/M2

## 2020-01-29 DIAGNOSIS — J10.1 INFLUENZA DUE TO INFLUENZA VIRUS, TYPE A, HUMAN: Primary | ICD-10-CM

## 2020-01-29 DIAGNOSIS — J45.30 MILD PERSISTENT ASTHMA, UNSPECIFIED WHETHER COMPLICATED: ICD-10-CM

## 2020-01-29 DIAGNOSIS — G89.29 OTHER CHRONIC PAIN: ICD-10-CM

## 2020-01-29 PROCEDURE — 99214 OFFICE O/P EST MOD 30 MIN: CPT | Performed by: FAMILY MEDICINE

## 2020-01-29 ASSESSMENT — MIFFLIN-ST. JEOR: SCORE: 1309.78

## 2020-01-29 NOTE — PROGRESS NOTES
Subjective     Miguelina Soria is a 59 year old female who presents to clinic today for the following health issues:    HPI   ED/UC Followup:    Facility:  [facility]  Date of visit: 1/23/20  Reason for visit: influenza A  Current Status: improving, still having weakness and lightheaded   Had severe flu with bronchitis off work for a few days, but will need more time   Current Outpatient Medications   Medication     albuterol (PROAIR HFA/PROVENTIL HFA/VENTOLIN HFA) 108 (90 Base) MCG/ACT inhaler     albuterol (PROVENTIL) (2.5 MG/3ML) 0.083% neb solution     ibuprofen (ADVIL/MOTRIN) 200 MG tablet     losartan (COZAAR) 50 MG tablet     metoprolol succinate ER (TOPROL-XL) 50 MG 24 hr tablet     metoprolol succinate ER (TOPROL-XL) 50 MG 24 hr tablet     omeprazole (PRILOSEC) 20 MG DR capsule     order for DME     PARoxetine (PAXIL) 20 MG tablet     zolpidem (AMBIEN) 5 MG tablet     No current facility-administered medications for this visit.              BP Readings from Last 3 Encounters:   01/29/20 (!) 148/88   01/23/20 128/61   01/23/20 (!) 140/80    Wt Readings from Last 3 Encounters:   01/29/20 74.4 kg (164 lb)   01/23/20 73.5 kg (162 lb)   09/17/19 74.4 kg (164 lb 1.6 oz)                    Reviewed and updated as needed this visit by Provider         Review of Systems   ROS COMP: Constitutional, HEENT, cardiovascular, pulmonary, GI, , musculoskeletal, neuro, skin, endocrine and psych systems are negative, except as otherwise noted.      Objective    BP (!) 148/88 (BP Location: Right arm, Patient Position: Chair, Cuff Size: Adult Regular)   Pulse 72   Temp 98.5  F (36.9  C) (Oral)   Resp 18   Wt 74.4 kg (164 lb)   LMP 01/01/2010   BMI 27.50 kg/m    Body mass index is 27.5 kg/m .  Physical Exam   GENERAL: healthy, alert and no distress  EYES: Eyes grossly normal to inspection, PERRL and conjunctivae and sclerae normal  HENT: ear canals and TM's normal, nose and mouth without ulcers or lesions  NECK: no adenopathy,  no asymmetry, masses, or scars and thyroid normal to palpation  RESP: lungs clear to auscultation - no rales, rhonchi or wheezes  CV: regular rate and rhythm, normal S1 S2, no S3 or S4, no murmur, click or rub, no peripheral edema and peripheral pulses strong  ABDOMEN: soft, nontender, no hepatosplenomegaly, no masses and bowel sounds normal  MS: no gross musculoskeletal defects noted, no edema  SKIN: no suspicious lesions or rashes  NEURO: Normal strength and tone, mentation intact and speech normal  PSYCH: mentation appears normal, affect normal/bright    Diagnostic Test Results:  Labs reviewed in Epic        Assessment & Plan     .(J10.1) Influenza due to influenza virus, type A, human  (primary encounter diagnosis)  Comment:   Plan: gradual recovery     (G89.29) Other chronic pain  Comment:   Plan: bacl     (J45.30) Mild persistent asthma, unspecified whether complicated  Comment:   Plan: flu trigger this time, continues off work                Return in about 4 weeks (around 2/26/2020).    Jose A Davila MD  [facility]

## 2020-01-29 NOTE — LETTER
[facility]  [address] [place], [place], [address]  [phone]        January 29, 2020    Miguelina Soria                                                                                                                                                       [address] [place] [address]      Off work until 2/3/2020 for acute influenza bronchitis. OK to work 2/3/2020.          Jose A Davila MD

## 2020-01-29 NOTE — LETTER
Miguelina  Thank you for choosing [facility] today for your health care needs.     [facility] is transforming primary care  At [facility], we re dedicated to constantly improve how we serve the health care needs of our patients and communities. We re currently making changes to the way we deliver care.     Changes you ll notice include:    An emphasis on building a relationship with a primary care provider    Access to a PAL (personal advocate and liaison) to help guide you with your care needs    Appointment lengths tailored to your specific needs and greater access to a care team to help you and your provider improve and maintain your health and well-being    Improved online access to your care team    Benefits of a primary care provider  If you don t have a designated primary care provider, we encourage you to get to know our care team online and find a provider you d like to see. Most of our providers have a short video on their online provider page. Visit [email or web address] to explore our providers and locations.    Benefits of having a primary care provider include:      They get to know you - your health history, family history and goals, making it easier to make a health plan together.     You get to know them - making health-related conversations and decisions easier      Primary care doctors help you when you re sick or hurt - but also focus on keeping you healthy with preventive care and screenings.      A doctor who sees you regularly is more likely to notice changes in your health.     You ll be connected to a broad care team who partners with your provider to support you.    Patient Advocate Liaison (PAL)   To help make sure you get the right care, at the right time, we include PALs, or Patient Advocate Liaisons, as part of your care team. Your PAL will be your first line of contact. They ll advocate for your needs and help you navigate our services, connecting you with care team members and community resources to  ensure your care is well coordinated. Your PAL is KRISTY Burroughs. Her phone number is [phone].     Expanded care team access with tailored appointment lengths  Depending on your health care needs, you may have longer or shorter appointments and see additional care team providers - including Medication Therapy Management (MTM) pharmacists, diabetes educators, behavioral health clinicians, or social workers. At times, they may be included in your visit with your provider, or you may see them individually.     Online access to your health care records and care team  [employer] is our online tool that makes it easy to see your health care information and communicate with your care team.     [employer] allows you to:     View your health maintenance plan so you know when you re due for a preventive screening    Send secure messages to your care team    View your health history and visit summaries     Schedule appointments     Complete questionnaires and eCheck-in before appointments      Get care from your provider with an e-visit      View and pay your bill     Sign up at [email or web address]. Once you have an account, you also can download the mobile derrick.  NIEVES Dalton, RN, PHN/Jose A Davila M.D.

## 2020-01-31 DIAGNOSIS — G47.9 SLEEP DISORDER: ICD-10-CM

## 2020-01-31 RX ORDER — ZOLPIDEM TARTRATE 5 MG/1
TABLET ORAL
Qty: 30 TABLET | Refills: 2 | Status: SHIPPED | OUTPATIENT
Start: 2020-01-31 | End: 2020-04-28

## 2020-01-31 NOTE — TELEPHONE ENCOUNTER
Last Written Prescription Date:  1.3.2020   Last Fill Quantity: 30,  # refills: 0   Last office visit: 1/29/2020 with prescribing provider:  Lola   Future Office Visit:   Next 5 appointments (look out 90 days)    Mar 18, 2020  9:00 AM CDT  (Arrive by 8:50 AM)  Ac[facility] Visit with Jose A [facility]ila [facility]  [facility] [address] [place] [id number] [phone]           Routing refill request to provider for review/approval because:  Drug not on the [facility] refill protocol

## 2020-03-12 DIAGNOSIS — J45.20 MILD INTERMITTENT ASTHMA, UNSPECIFIED WHETHER COMPLICATED: Primary | ICD-10-CM

## 2020-03-12 RX ORDER — ALBUTEROL SULFATE 0.83 MG/ML
2.5 SOLUTION RESPIRATORY (INHALATION) EVERY 6 HOURS PRN
Qty: 75 ML | Refills: 0 | Status: SHIPPED | OUTPATIENT
Start: 2020-03-12 | End: 2020-03-23

## 2020-03-12 NOTE — TELEPHONE ENCOUNTER
Routing refill request to provider for review/approval because:  Labs out of range:  ACT  ACT Total Scores 4/21/2017 11/29/2017 8/21/2018   ACT TOTAL SCORE - - -   ASTHMA ER VISITS - - -   ASTHMA HOSPITALIZATIONS - - -   ACT TOTAL SCORE (Goal Greater than or Equal to 20) 25 25 17   In the past 12 months, how many times did you visit the emergency room for your asthma without being admitted to the hospital? 0 0 0   In the past 12 months, how many times were you hospitalized overnight because of your asthma? 0 0 0     Rebeca Sweeney RN, BSN

## 2020-03-17 ENCOUNTER — VIRTUAL VISIT (OUTPATIENT)
Dept: FAMILY MEDICINE | Facility: OTHER | Age: 60
End: 2020-03-17

## 2020-03-17 NOTE — PROGRESS NOTES
"Date: 2020 11:32:07  Clinician: Katheryn Galdamez  Clinician NPI: [id number]  Patient: Miguelina Soria  Patient : 1960  Patient Address: [address] [place] [address]  Patient Phone: [phone]  Visit Protocol: URI  Patient Summary:  Miguelina is a 59 year old ( : 1960 ) female who initiated a Visit for COVID-19 (Coronavirus) evaluation and screening. When asked the question \"Please sign me up to receive news, health information and promotions from [employer].\", Miguelina responded \"Yes\".    Miguelina states her symptoms started today.   Her symptoms consist of wheezing, a cough, chills, malaise, a headache, and myalgia. She is experiencing mild difficulty breathing with activities but can speak normally in full sentences. Miguelina also feels feverish but was unable to measure her temperature.   Symptom details     Cough: Miguelina coughs a few times an hour and her cough is not more bothersome at night. Phlegm comes into her throat when she coughs. She does not believe her cough is caused by post-nasal drip. The color of the phlegm is green and yellow.     Wheezing: Miguelina has been diagnosed with asthma. The wheezing does not interfere with her normal daily activities.    Headache: She states the headache is mild (1-3 on a 10 point pain scale).      Miguelina denies having sore throat, nasal congestion, teeth pain, ear pain, rhinitis, and facial pain or pressure. She also denies taking antibiotic medication for the symptoms and having recent facial or sinus surgery in the past 60 days.   Precipitating events  She has recently been exposed to someone with influenza. Miguelina has not been in close contact with any high risk individuals.   Pertinent COVID-19 (Coronavirus) information  Miguelina has not traveled internationally or to the areas where COVID-19 (Coronavirus) is widespread in the last 14 days before the start of her symptoms.   Miguelina has not had a close contact with a laboratory-confirmed COVID-19 patient within 14 days " of symptom onset. She also has not had a close contact with a suspected COVID-19 patient within 14 days of symptom onset.   Miguelina is not a healthcare worker and does not work in a healthcare facility.   Pertinent medical history  Miguelina had 1 sinus infection within the past year.   Miguelina does not get yeast infections when she takes antibiotics.   Miguelina needs a return to work/school note.   Weight: 165 lbs   Miguelina does not smoke or use smokeless tobacco.   Weight: 165 lbs    MEDICATIONS: Ambien oral, ALLERGIES: NKDA  Clinician Response:  Dear Miguelina,   Based on the information you have provided, you do have symptoms that are consistent with Coronavirus (COVID-19).  The coronavirus causes mild to severe respiratory illness with the most common symptoms including fever, cough and difficulty breathing. Unfortunately, many viruses cause similar symptoms and it can be difficult to distinguish between viruses, especially in mild cases, so we are presuming that anyone with cough or fever has coronavirus at this time.  Coronavirus/COVID-19 has reached the point of community spread in [place], meaning that we are finding the virus in people with no known exposure risk for ezio the virus. Given the increasing commonness of coronavirus in the community we are no longer testing patients who are not critically ill.  For everyone else who has cough or fever, you should assume you are infected with coronavirus. Accordingly, you should self-quarantine for fourteen days from the first day your symptoms started. You should call if you find increasing shortness of breath, wheezing or sustained fever above 101.5. If you are significantly short of breath or experience chest pain you should call 911 or report to the nearest emergency department for urgent evaluation.    Isolate yourself at home.   Do Not allow any visitors  Do Not go to work or school  Do Not go to [employer],  centers, shopping, or other public places.  Do Not  shake hands.  Avoid close contact with others (hugging, kissing).   Protect Others:    Cover Your Mouth and Nose with a mask, disposable tissue or wash cloth to avoid spreading germs to others.  Wash your hands and face frequently with soap and water.   If you develop significant shortness of breath that prevents you from doing normal activities, please call 911 or proceed to the nearest emergency room and alert them immediately that you have been in self-isolation for possible coronavirus.   For more information about COVID19 and options for caring for yourself at home, please visit the CDC website at https://www.cdc.gov/coronavirus/2019-ncov/about/steps-when-sick.htmlFor more options for care at [facility], please visit our website at [email or web address]     Diagnosis: Cough  Diagnosis ICD: R05

## 2020-03-22 DIAGNOSIS — J45.20 MILD INTERMITTENT ASTHMA, UNSPECIFIED WHETHER COMPLICATED: ICD-10-CM

## 2020-03-23 RX ORDER — ALBUTEROL SULFATE 0.83 MG/ML
SOLUTION RESPIRATORY (INHALATION)
Qty: 75 ML | Refills: 0 | Status: SHIPPED | OUTPATIENT
Start: 2020-03-23 | End: 2020-03-30

## 2020-03-23 NOTE — TELEPHONE ENCOUNTER
"Routing refill request to provider for review/approval because:  ACT is over due so RN cannot refill      Requested Prescriptions   Pending Prescriptions Disp Refills     albuterol (PROVENTIL) (2.5 MG/3ML) 0.083% neb solution [Pharmacy Med Name: ALBUTEROL 0.083%(2.5MG/3ML) 25X3ML] 75 mL 0     Sig: USE 1 VIAL PER NEBULIZER EVERY 6 HOURS AS NEEDED SHORTNESS OF BREATH AND OR WHEEZING.       Asthma Maintenance Inhalers - Anticholinergics Failed - 3/23/2020 10:17 AM        Failed - Asthma control assessment score within normal limits in last 6 months     Please review ACT score.           Passed - Patient is age 12 years or older        Passed - Medication is active on med list        Passed - Recent (6 mo) or future (30 days) visit within the authorizing provider's specialty     Patient had office visit in the last 6 months or has a visit in the next 30 days with authorizing provider or within the authorizing provider's specialty.  See \"Patient Info\" tab in inbasket, or \"Choose Columns\" in Meds & Orders section of the refill encounter.           Short-Acting Beta Agonist Inhalers Protocol  Failed - 3/23/2020 10:17 AM        Failed - Asthma control assessment score within normal limits in last 6 months     Please review ACT score.           Passed - Patient is age 12 or older        Passed - Medication is active on med list        Passed - Recent (6 mo) or future (30 days) visit within the authorizing provider's specialty     Patient had office visit in the last 6 months or has a visit in the next 30 days with authorizing provider or within the authorizing provider's specialty.  See \"Patient Info\" tab in inbasket, or \"Choose Columns\" in Meds & Orders section of the refill encounter.               ACT Total Scores 4/21/2017 11/29/2017 8/21/2018   ACT TOTAL SCORE - - -   ASTHMA ER VISITS - - -   ASTHMA HOSPITALIZATIONS - - -   ACT TOTAL SCORE (Goal Greater than or Equal to 20) 25 25 17   In the past 12 months, how many " times did you visit the emergency room for your asthma without being admitted to the hospital? 0 0 0   In the past 12 months, how many times were you hospitalized overnight because of your asthma? 0 0 0         Lizet Correa RN, BSN, PHN

## 2020-03-26 ENCOUNTER — TELEPHONE (OUTPATIENT)
Dept: FAMILY MEDICINE | Facility: CLINIC | Age: 60
End: 2020-03-26

## 2020-03-26 DIAGNOSIS — J20.9 ACUTE BRONCHITIS, UNSPECIFIED ORGANISM: Primary | ICD-10-CM

## 2020-03-26 RX ORDER — AZITHROMYCIN 250 MG/1
TABLET, FILM COATED ORAL
Qty: 6 TABLET | Refills: 0 | Status: SHIPPED | OUTPATIENT
Start: 2020-03-26 | End: 2020-04-23

## 2020-03-26 NOTE — TELEPHONE ENCOUNTER
03/26/2020          General Call:   Who is calling:  Patient  Reason for Call:  Believes she may have Bronchitis or Pneumonia  What are your questions or concerns:  Patient stating this is something she has been seen for multiple times and wanted to see if she could have an antibiotic for it or if a Phone visit would work?  Date of last appointment with provider: 01/29/2020  Okay to leave a detailed message:Yes at Cell number on file:    Telephone Information:   GR[facility] [phone]

## 2020-03-26 NOTE — TELEPHONE ENCOUNTER
Patient calls, believes she has bronchitis or pneumonia, would like to know if she can have abx with or without phone visit. [facility] visit from 3/17/2020 gave patient diagnosis of probable COVID19. Please advise.   Heike Quintanilla, JONIN, RN, PHN

## 2020-03-26 NOTE — TELEPHONE ENCOUNTER
Called and left message, detailed prescription information, provided direct line and clinic line if patient has further questions.   Heike Quintanilla, JONIN, RN, PHN

## 2020-03-27 ENCOUNTER — HOSPITAL ENCOUNTER (EMERGENCY)
Facility: CLINIC | Age: 60
Discharge: HOME | End: 2020-03-27
Attending: EMERGENCY MEDICINE | Admitting: EMERGENCY MEDICINE
Payer: COMMERCIAL

## 2020-03-27 ENCOUNTER — APPOINTMENT (OUTPATIENT)
Dept: GENERAL RADIOLOGY | Facility: CLINIC | Age: 60
End: 2020-03-27
Attending: EMERGENCY MEDICINE
Payer: COMMERCIAL

## 2020-03-27 ENCOUNTER — TELEPHONE (OUTPATIENT)
Dept: FAMILY MEDICINE | Facility: CLINIC | Age: 60
End: 2020-03-27

## 2020-03-27 VITALS
WEIGHT: 165 LBS | HEART RATE: 95 BPM | HEIGHT: 65 IN | BODY MASS INDEX: 27.49 KG/M2 | TEMPERATURE: 97.8 F | DIASTOLIC BLOOD PRESSURE: 103 MMHG | SYSTOLIC BLOOD PRESSURE: 174 MMHG | OXYGEN SATURATION: 93 % | RESPIRATION RATE: 17 BRPM

## 2020-03-27 DIAGNOSIS — J45.901 EXACERBATION OF ASTHMA, UNSPECIFIED ASTHMA SEVERITY, UNSPECIFIED WHETHER PERSISTENT: ICD-10-CM

## 2020-03-27 DIAGNOSIS — I10 HYPERTENSION, UNSPECIFIED TYPE: ICD-10-CM

## 2020-03-27 LAB
ANION GAP SERPL CALCULATED.3IONS-SCNC: 4 MMOL/L (ref 3–14)
BASOPHILS # BLD AUTO: 0.1 10E9/L (ref 0–0.2)
BASOPHILS NFR BLD AUTO: 1.4 %
BUN SERPL-MCNC: 12 MG/DL (ref 7–30)
CALCIUM SERPL-MCNC: 9.1 MG/DL (ref 8.5–10.1)
CHLORIDE SERPL-SCNC: 106 MMOL/L (ref 94–109)
CO2 SERPL-SCNC: 26 MMOL/L (ref 20–32)
CREAT SERPL-MCNC: 0.53 MG/DL (ref 0.52–1.04)
DIFFERENTIAL METHOD BLD: ABNORMAL
EOSINOPHIL # BLD AUTO: 1.9 10E9/L (ref 0–0.7)
EOSINOPHIL NFR BLD AUTO: 24.3 %
ERYTHROCYTE [DISTWIDTH] IN BLOOD BY AUTOMATED COUNT: 13.1 % (ref 10–15)
GFR SERPL CREATININE-BSD FRML MDRD: >90 ML/MIN/{1.73_M2}
GLUCOSE SERPL-MCNC: 124 MG/DL (ref 70–99)
HCT VFR BLD AUTO: 41.6 % (ref 35–47)
HGB BLD-MCNC: 13.7 G/DL (ref 11.7–15.7)
IMM GRANULOCYTES # BLD: 0 10E9/L (ref 0–0.4)
IMM GRANULOCYTES NFR BLD: 0.1 %
INTERPRETATION ECG - MUSE: NORMAL
LYMPHOCYTES # BLD AUTO: 1.5 10E9/L (ref 0.8–5.3)
LYMPHOCYTES NFR BLD AUTO: 20.2 %
MCH RBC QN AUTO: 30.6 PG (ref 26.5–33)
MCHC RBC AUTO-ENTMCNC: 32.9 G/DL (ref 31.5–36.5)
MCV RBC AUTO: 93 FL (ref 78–100)
MONOCYTES # BLD AUTO: 0.4 10E9/L (ref 0–1.3)
MONOCYTES NFR BLD AUTO: 4.6 %
NEUTROPHILS # BLD AUTO: 3.8 10E9/L (ref 1.6–8.3)
NEUTROPHILS NFR BLD AUTO: 49.4 %
NRBC # BLD AUTO: 0 10*3/UL
NRBC BLD AUTO-RTO: 0 /100
PLATELET # BLD AUTO: 293 10E9/L (ref 150–450)
POTASSIUM SERPL-SCNC: 4.1 MMOL/L (ref 3.4–5.3)
RBC # BLD AUTO: 4.48 10E12/L (ref 3.8–5.2)
SODIUM SERPL-SCNC: 136 MMOL/L (ref 133–144)
TROPONIN I SERPL-MCNC: <0.015 UG/L (ref 0–0.04)
WBC # BLD AUTO: 7.6 10E9/L (ref 4–11)

## 2020-03-27 PROCEDURE — 84484 ASSAY OF TROPONIN QUANT: CPT | Performed by: EMERGENCY MEDICINE

## 2020-03-27 PROCEDURE — 25000132 ZZH RX MED GY IP 250 OP 250 PS 637: Performed by: EMERGENCY MEDICINE

## 2020-03-27 PROCEDURE — 85025 COMPLETE CBC W/AUTO DIFF WBC: CPT | Performed by: EMERGENCY MEDICINE

## 2020-03-27 PROCEDURE — 71045 X-RAY EXAM CHEST 1 VIEW: CPT

## 2020-03-27 PROCEDURE — 94640 AIRWAY INHALATION TREATMENT: CPT

## 2020-03-27 PROCEDURE — 93005 ELECTROCARDIOGRAM TRACING: CPT

## 2020-03-27 PROCEDURE — 96365 THER/PROPH/DIAG IV INF INIT: CPT

## 2020-03-27 PROCEDURE — 99285 EMERGENCY DEPT VISIT HI MDM: CPT | Mod: 25

## 2020-03-27 PROCEDURE — 25000131 ZZH RX MED GY IP 250 OP 636 PS 637: Performed by: EMERGENCY MEDICINE

## 2020-03-27 PROCEDURE — 25000128 H RX IP 250 OP 636: Performed by: EMERGENCY MEDICINE

## 2020-03-27 PROCEDURE — 80048 BASIC METABOLIC PNL TOTAL CA: CPT | Performed by: EMERGENCY MEDICINE

## 2020-03-27 RX ORDER — PREDNISONE 20 MG/1
TABLET ORAL
Qty: 10 TABLET | Refills: 0 | Status: SHIPPED | OUTPATIENT
Start: 2020-03-27 | End: 2020-04-23

## 2020-03-27 RX ORDER — ALBUTEROL SULFATE 90 UG/1
6 AEROSOL, METERED RESPIRATORY (INHALATION)
Status: COMPLETED | OUTPATIENT
Start: 2020-03-27 | End: 2020-03-27

## 2020-03-27 RX ORDER — PREDNISONE 20 MG/1
60 TABLET ORAL ONCE
Status: COMPLETED | OUTPATIENT
Start: 2020-03-27 | End: 2020-03-27

## 2020-03-27 RX ORDER — MAGNESIUM SULFATE HEPTAHYDRATE 40 MG/ML
2 INJECTION, SOLUTION INTRAVENOUS ONCE
Status: COMPLETED | OUTPATIENT
Start: 2020-03-27 | End: 2020-03-27

## 2020-03-27 RX ORDER — LIDOCAINE 40 MG/G
CREAM TOPICAL
Status: DISCONTINUED | OUTPATIENT
Start: 2020-03-27 | End: 2020-03-27 | Stop reason: HOSPADM

## 2020-03-27 RX ADMIN — MAGNESIUM SULFATE 2 G: 2 INJECTION INTRAVENOUS at 12:31

## 2020-03-27 RX ADMIN — PREDNISONE 60 MG: 20 TABLET ORAL at 11:11

## 2020-03-27 RX ADMIN — ALBUTEROL SULFATE 6 PUFF: 90 AEROSOL, METERED RESPIRATORY (INHALATION) at 10:47

## 2020-03-27 RX ADMIN — ALBUTEROL SULFATE 6 PUFF: 90 AEROSOL, METERED RESPIRATORY (INHALATION) at 10:30

## 2020-03-27 RX ADMIN — ALBUTEROL SULFATE 6 PUFF: 90 AEROSOL, METERED RESPIRATORY (INHALATION) at 11:00

## 2020-03-27 ASSESSMENT — ENCOUNTER SYMPTOMS
RHINORRHEA: 0
COUGH: 1
SHORTNESS OF BREATH: 1
FEVER: 0

## 2020-03-27 ASSESSMENT — MIFFLIN-ST. JEOR: SCORE: 1314.32

## 2020-03-27 NOTE — ED TRIAGE NOTES
"Hx asthma, needing to use inhaler q2h over night.  Is on a z-pack from PCP; started yesterday.  States \"I've been having breathing issues for the past week.  I've been using the inhaler.  I thought it was just my asthma\". Awoke this morning \"having a real hard time breathing.\"  States could barely catch breath with walking upstairs. No distress noted, satting 94% on RA, not tachypneic. Able to talk without resting to catch breath.  ABCD's intact;   A/O x 4.   "

## 2020-03-27 NOTE — TELEPHONE ENCOUNTER
Patient calling and states she is short of breath.  States she had flu in January and states has been sick ever since.  Called yesterday and Dr. Lola Kay.  States she used her nebulizer and inhaler and still having trouble breathing.  Does sound short of breath.  Advised someone bring her to ER now.  Advised not to drive self.  Patient agrees with plan.  Sarita Elliott RN

## 2020-03-27 NOTE — DISCHARGE INSTRUCTIONS
Refill your blood pressure medications and start taking them again. Monitor and record blood pressure at home to discuss with PMD on Monday.   Continue with smoking cessation.

## 2020-03-27 NOTE — ED AVS SNAPSHOT
[facility] Emergency Department  [address]  [facility] [address]  Phone:  [phone]  Fax:  [phone]                                    Miguelina Soria   MRN: [id number]    Department:  [facility] Emergency Department   Date of Visit:  3/27/2020           After Visit Summary Signature Page    I have received my discharge instructions, and my questions have been answered. I have discussed any challenges I see with this plan with the nurse or doctor.    ..........................................................................................................................................  Patient/Patient Representative Signature      ..........................................................................................................................................  Patient Representative Print Name and Relationship to Patient    ..................................................               ................................................  Date                                   Time    ..........................................................................................................................................  Reviewed by Signature/Title    ...................................................              ..............................................  Date                                               Time          22EPIC Rev 08/18

## 2020-03-27 NOTE — ED PROVIDER NOTES
History     Chief Complaint:  Shortness of Breath      The history is provided by the patient.      Miguelina Soria is a 59 year old female with a history of asthma who presents for evaluation of shortness of breath and a cough starting one week ago which worsened today. The patient states that today, she was unable to walk up the stairs due to feeling so short of breath. She called his primary MD yesterday at [facility] [place] who prescribed her a Z-pack which she started yesterday but has been worsening despite starting this. She has used an inhaler/nebulizer during the past week with little improvement, thinking that this was asthma related. The patient states that she has a history of tobacco use, quitting around ten years ago however starting again last fall and quitting again in January. She presents today concerned that her shortness of breath worsened today.     Allergies:  No Known Drug Allergies    Medications:    Albuterol nebules  Albuterol inhaler  Zolpidem     Past Medical History:    Depressive disorder  Hyperlipidemia  Asthma  GERD  Fibromyalgia    Past Surgical History:    Knee replacement, left    Family History:    History reviewed. No pertinent family history.    Social History:  The patient presents to the ED alone.  Smoking Status: Former Smoker, 20 pack years  Smokeless Tobacco: Never Used  Alcohol Use: No  Drug Use: No  PCP: Jose A Davila     Review of Systems   Constitutional: Negative for fever.   HENT: Negative for rhinorrhea.    Respiratory: Positive for cough and shortness of breath.    Cardiovascular: Negative for chest pain.   All other systems reviewed and are negative.      Physical Exam     Patient Vitals for the past 24 hrs:   BP Temp Temp src Pulse Heart Rate Resp SpO2 Height Weight   03/27/20 1250 -- -- -- -- 97 17 92 % -- --   03/27/20 1245 (!) 174/103 -- -- 101 98 18 91 % -- --   03/27/20 1235 (!) 156/104 -- -- 93 96 15 93 % -- --   03/27/20 1215 (!) 179/108 --  "-- 101 101 20 92 % -- --   03/27/20 1200 (!) 170/103 -- -- 90 93 19 92 % -- --   03/27/20 1145 (!) 180/96 -- -- 90 90 18 92 % -- --   03/27/20 1130 (!) 167/124 -- -- 90 90 18 93 % -- --   03/27/20 1115 (!) 175/97 -- -- 99 96 15 94 % -- --   03/27/20 1100 (!) 140/112 -- -- 87 96 14 96 % -- --   03/27/20 1055 -- -- -- -- 89 -- 95 % -- --   03/27/20 1050 -- -- -- -- 84 -- 96 % -- --   03/27/20 1045 (!) 143/102 -- -- 84 84 17 96 % -- --   03/27/20 1040 -- -- -- -- 92 -- 97 % -- --   03/27/20 1035 -- -- -- -- -- -- 96 % -- --   03/27/20 1030 (!) 161/132 -- -- 86 -- -- 96 % -- --   03/27/20 1025 -- -- -- -- -- -- 98 % -- --   03/27/20 1015 -- -- -- 81 -- -- 94 % -- --   03/27/20 1010 -- -- -- -- -- -- 94 % -- --   03/27/20 1005 -- -- -- -- -- -- 93 % -- --   03/27/20 1002 (!) 165/108 97.8  F (36.6  C) Oral 80 80 18 94 % 1.651 m (5' 5\") 74.8 kg (165 lb)   03/27/20 1000 (!) 165/108 -- -- 81 -- -- 94 % -- --       Physical Exam    Nursing note and vitals reviewed.    Constitutional: Pleasant and well groomed.          HENT:    Mouth/Throat: Oropharynx is without swelling or erythema. Oral mucosa moist.    Eyes: Conjunctivae are normal. No scleral icterus.    Neck: Neck supple.   Cardiovascular: Normal rate, regular rhythm and intact distal pulses.    Pulmonary/Chest: Mild increased work of breathing. Diminished breath sounds. Diffuse inspiratory and expiratory wheezing.   Abdominal: Soft.  No distension. There is no tenderness.   Musculoskeletal:  No edema, No calf tenderness  Neurological:Alert and answering questions appropriately. Coordination normal.   Skin: Skin is warm and dry.   Psychiatric: Normal mood and affect.     Emergency Department Course     ECG:  Indication: Shortness of Breath  Time: 0938  Vent. Rate 87 bpm. VT interval 142. QRS duration 84. QT/QTc 346/416. P-R-T axis 70 60 54. Normal sinus rhythm. Normal ECG. Read time: 0942      Imaging:  Radiology findings were communicated with the patient who voiced " understanding of the findings.    XR Chest Port 1 View:  No acute disease.  As per radiology.     Laboratory:  Laboratory findings were communicated with the patient who voiced understanding of the findings.    CBC: WBC: 7.6, HGB: 13.7, PLT: 293    BMP: Glucose 124 (high), o/w WNL (Creatinine: 0.53)    1025 Troponin: <0.015     Interventions:  1030 Albuterol 6 puffs inhalation  1047 Albuterol 6 puffs inhalation  1100 Albuterol 6 puffs inhalation  1111 Prednisone 60mg PO  1231 Magnesium sulfate 2g IV infusion    Emergency Department Course:  Past medical records, nursing notes, and vitals reviewed.    0950 I performed an exam of the patient as documented above.     EKG obtained in the ED, see results above.   IV was inserted and blood was drawn for laboratory testing, results above.  Portable chest x-ray was used at the patient's bedside, see results above.     1104 I rechecked the patient and discussed the results of her workup thus far. The patient is still wheezing diffusely. Possible increased air movement.     1250  I rechecked the patient who was able to ambulate without shortness of breath. She states that her symptoms are improved, but not resolved.     Findings and plan explained to the patient. Patient discharged home with instructions regarding supportive care, medications, and reasons to return. The importance of close follow-up was reviewed. The patient was prescribed prednisone.    I personally reviewed the laboratory and imaging results with the patient and answered all related questions prior to discharge.     Impression & Plan     Medical Decision Making:  Miguelina Soria is a 59 year old female with a history of asthma who presents with the constellation of symptoms as noted above.  She was noted to have normal oxygen saturation, to be afebrile, and to have elevated blood pressure on arrival.  Lung exam reveals diffuse wheezing and increased work of breathing.  Differential diagnosis included but was  not limited to hypertensive urgency, asthma exacerbation, pneumonia, viral infection, bronchitis, congestive heart failure, anginal equivalent.  ED evaluation is as noted above. The EKG was without findings concerning for acute coronary syndrome.  Her troponin and the remainder of her labs were unremarkable.  Chest x-ray did not show any findings concerning for pneumonia or COVID 19.  She had some improvement of her symptoms with MDI administered albuterol.  Prednisone was administered after reviewing her chest x-ray as I feel that her symptoms are related to an untreated asthma exacerbation.  I did also administer magnesium.  She is now able to ambulate without shortness of breath.  She had no desaturations with ambulation.  With reasonable clinical certainty ability safe for discharge home.  I recommended that she refill her blood pressure medications as she does now have insurance which was the reason she had not been taking this.  Also recommend that she monitor her blood pressure over the next few days and connect with her primary care physician on Monday with follow-up on her symptoms and blood pressure.  She understands to return to the emergency department with any new or worsening symptoms or if she is not improving as anticipated.    Diagnosis:    ICD-10-CM    1. Exacerbation of asthma, unspecified asthma severity, unspecified whether persistent  J45.901    2. Hypertension, unspecified type  I10        Disposition:  Discharged to home.    Discharge Medications:  New Prescriptions    PREDNISONE (DELTASONE) 20 MG TABLET    Take two tablets (= 40mg) each day for 5 (five) days       Scribe Disclosure:  Deondre AVILA, am serving as a scribe at 9:39 AM on 3/27/2020 to document services personally performed by Heike Arellano MD based on my observations and the provider's statements to me.      Heike Arellano MD  03/27/20 0367

## 2020-03-27 NOTE — ED NOTES
"Ambulated around room roughly 140'.  States \"I don't feel so winded.\"  Reports breathing feels \"much better\".  Satting 98% on RA upon arrival back to bed.   "

## 2020-03-30 ENCOUNTER — VIRTUAL VISIT (OUTPATIENT)
Dept: FAMILY MEDICINE | Facility: CLINIC | Age: 60
End: 2020-03-30
Payer: COMMERCIAL

## 2020-03-30 DIAGNOSIS — J45.20 MILD INTERMITTENT ASTHMA, UNSPECIFIED WHETHER COMPLICATED: ICD-10-CM

## 2020-03-30 DIAGNOSIS — J45.41 MODERATE PERSISTENT ASTHMA WITH EXACERBATION: Primary | ICD-10-CM

## 2020-03-30 PROCEDURE — 99213 OFFICE O/P EST LOW 20 MIN: CPT | Mod: TEL | Performed by: FAMILY MEDICINE

## 2020-03-30 RX ORDER — ALBUTEROL SULFATE 0.83 MG/ML
SOLUTION RESPIRATORY (INHALATION)
Qty: 75 ML | Refills: 0 | Status: SHIPPED | OUTPATIENT
Start: 2020-03-30 | End: 2020-04-09

## 2020-03-30 NOTE — TELEPHONE ENCOUNTER
"Requested Prescriptions   Pending Prescriptions Disp Refills     albuterol (PROVENTIL) (2.5 MG/3ML) 0.083% neb solution [Pharmacy Med Name: ALBUTEROL 0.083%(2.5MG/3ML) 25X3ML] 75 mL 0     Sig: USE 1 VIAL VIA NEBULIZER EVERY 6 HOURS AS NEEDED FOR SHORTNESS OF BREATH OR WHEEZING       Asthma Maintenance Inhalers - Anticholinergics Failed - 3/30/2020  2:14 PM        Failed - Asthma control assessment score within normal limits in last 6 months     Please review ACT score.           Passed - Patient is age 12 years or older        Passed - Medication is active on med list        Passed - Recent (6 mo) or future (30 days) visit within the authorizing provider's specialty     Patient had office visit in the last 6 months or has a visit in the next 30 days with authorizing provider or within the authorizing provider's specialty.  See \"Patient Info\" tab in inbasket, or \"Choose Columns\" in Meds & Orders section of the refill encounter.           Short-Acting Beta Agonist Inhalers Protocol  Failed - 3/30/2020  2:14 PM        Failed - Asthma control assessment score within normal limits in last 6 months     Please review ACT score.           Passed - Patient is age 12 or older        Passed - Medication is active on med list        Passed - Recent (6 mo) or future (30 days) visit within the authorizing provider's specialty     Patient had office visit in the last 6 months or has a visit in the next 30 days with authorizing provider or within the authorizing provider's specialty.  See \"Patient Info\" tab in inbasket, or \"Choose Columns\" in Meds & Orders section of the refill encounter.               ACT Total Scores 4/21/2017 11/29/2017 8/21/2018   ACT TOTAL SCORE - - -   ASTHMA ER VISITS - - -   ASTHMA HOSPITALIZATIONS - - -   ACT TOTAL SCORE (Goal Greater than or Equal to 20) 25 25 17   In the past 12 months, how many times did you visit the emergency room for your asthma without being admitted to the hospital? 0 0 0   In the " past 12 months, how many times were you hospitalized overnight because of your asthma? 0 0 0

## 2020-03-30 NOTE — LETTER
My Asthma Action Plan    Name: Miguelina Soria   YOB: 1960  Date: 3/30/2020   My doctor: Jose A Davila MD   My clinic: [facility]        My Control Medicine:   My Rescue Medicine: Albuterol (Proair/Ventolin/Proventil HFA) 2-4 puffs EVERY 4 HOURS as needed. Use a spacer if recommended by your provider.  My Oral Steroid Medicine: prednisone  My Asthma Severity:   Moderate Persistent  Know your asthma triggers: smoke, animal dander and mold               GREEN ZONE   Good Control    I feel good    No cough or wheeze    Can work, sleep and play without asthma symptoms       Take your asthma control medicine every day.     1. If exercise triggers your asthma, take your rescue medication    15 minutes before exercise or sports, and    During exercise if you have asthma symptoms  2. Spacer to use with inhaler: If you have a spacer, make sure to use it with your inhaler             YELLOW ZONE Getting Worse  I have ANY of these:    I do not feel good    Cough or wheeze    Chest feels tight    Wake up at night   1. Keep taking your Green Zone medications  2. Start taking your rescue medicine:    every 20 minutes for up to 1 hour. Then every 4 hours for 24-48 hours.  3. If you stay in the Yellow Zone for more than 12-24 hours, contact your doctor.  4. If you do not return to the Green Zone in 12-24 hours or you get worse, start taking your oral steroid medicine if prescribed by your provider.           RED ZONE Medical Alert - Get Help  I have ANY of these:    I feel awful    Medicine is not helping    Breathing getting harder    Trouble walking or talking    Nose opens wide to breathe       1. Take your rescue medicine NOW  2. If your provider has prescribed an oral steroid medicine, start taking it NOW  3. Call your doctor NOW  4. If you are still in the Red Zone after 20 minutes and you have not reached your doctor:    Take your rescue medicine again and    Call 911 or go to the  emergency room right away    See your regular doctor within 2 weeks of an Emergency Room or Urgent Care visit for follow-up treatment.          Annual Reminders:  Meet with Asthma Educator,  Flu Shot in the Fall, consider Pneumonia Vaccination for patients with asthma (aged 19 and older).    Pharmacy:    [id number] - [place], [place] - [address] AT [id number] - [place] - [address] [facility][id number] - [place], [place] - [address] AT [facility]    Electronically signed by Jose A Davila MD   Date: 03/30/20                      Asthma Triggers  How To Control Things That Make Your Asthma Worse    Triggers are things that make your asthma worse.  Look at the list below to help you find your triggers and what you can do about them.  You can help prevent asthma flare-ups by staying away from your triggers.      Trigger                                                          What you can do   Cigarette Smoke  Tobacco smoke can make asthma worse. Do not allow smoking in your home, car or around you.  Be sure no one smokes at a child s day care or school.  If you smoke, ask your health care provider for ways to help you quit.  Ask family members to quit too.  Ask your health care provider for a referral to Quit Plan to help you quit smoking, or call 4-171-231-PLAN.     Colds, Flu, Bronchitis  These are common triggers of asthma. Wash your hands often.  Don t touch your eyes, nose or mouth.  Get a flu shot every year.     Dust Mites  These are tiny bugs that live in cloth or carpet. They are too small to see. Wash sheets and blankets in hot water every week.   Encase pillows and mattress in dust mite proof covers.  Avoid having carpet if you can. If you have carpet, vacuum weekly.   Use a dust mask and HEPA vacuum.   Pollen and Outdoor Mold  Some people are allergic to trees, grass, or weed pollen, or molds. Try to keep  your windows closed.  Limit time out doors when pollen count is high.   Ask you health care provider about taking medicine during allergy season.     Animal Dander  Some people are allergic to skin flakes, urine or saliva from pets with fur or feathers. Keep pets with fur or feathers out of your home.    If you can t keep the pet outdoors, then keep the pet out of your bedroom.  Keep the bedroom door closed.  Keep pets off cloth furniture and away from stuffed toys.     Mice, Rats, and Cockroaches   Some people are allergic to the waste from these pests.   Cover food and garbage.  Clean up spills and food crumbs.  Store grease in the refrigerator.   Keep food out of the bedroom.   Indoor Mold  This can be a trigger if your home has high moisture. Fix leaking faucets, pipes, or other sources of water.   Clean moldy surfaces.  Dehumidify basement if it is damp and smelly.   Smoke, Strong Odors, and Sprays  These can reduce air quality. Stay away from strong odors and sprays, such as perfume, powder, hair spray, paints, smoke incense, paint, cleaning products, candles and new carpet.   Exercise or Sports  Some people with asthma have this trigger. Be active!  Ask your doctor about taking medicine before sports or exercise to prevent symptoms.    Warm up for 5-10 minutes before and after sports or exercise.     Other Triggers of Asthma  Cold air:  Cover your nose and mouth with a scarf.  Sometimes laughing or crying can be a trigger.  Some medicines and food can trigger asthma.

## 2020-03-30 NOTE — PROGRESS NOTES
"Subjective     Miguelina Soria is a 59 year old female who is being evaluated via a billable telephone visit.      The patient has been notified of following:     \"This telephone visit will be conducted via a call between you and your physician/provider. We have found that certain health care needs can be provided without the need for a physical exam.  This service lets us provide the care you need with a short phone conversation.  If a prescription is necessary we can send it directly to your pharmacy.  If lab work is needed we can place an order for that and you can then stop by our lab to have the test done at a later time.    If during the course of the call the physician/provider feels a telephone visit is not appropriate, you will not be charged for this service.\"     Physician has received verbal consent for a Telephone Visit from the patient? Yes    Miguelina Soria complains of   Chief Complaint   Patient presents with     Hospital F/U     Discussed her asthma attack and the covid pandemic, she was too well to test at the outpatient ER and now is radically improved. We discussed the 7 day self isolation and her RTW note from Dr. Arellano  ALLERGIES  Patient has no known allergies.    ED/UC Followup:    Facility:  [facility]  Date of visit: 3/27/2020  Reason for visit: having trouble breathing   Current Status: doing much better with the breathing                BP Readings from Last 3 Encounters:   03/27/20 (!) 174/103   01/29/20 (!) 148/88   01/23/20 128/61    Wt Readings from Last 3 Encounters:   03/27/20 74.8 kg (165 lb)   01/29/20 74.4 kg (164 lb)   01/23/20 73.5 kg (162 lb)                    Reviewed and updated as needed this visit by Provider         Review of Systems   ROS COMP: Constitutional, HEENT, cardiovascular, pulmonary, gi and gu systems are negative, except as otherwise noted.       Objective   Reported vitals:  LMP 01/01/2010    healthy, alert and no " distress  Psych: Alert and oriented times 3; coherent speech, normal   rate and volume, able to articulate logical thoughts, able   to abstract reason, no tangential thoughts, no hallucinations   or delusions  Her affect is upbeat              Assessment/Plan:  (J45.41) Moderate persistent asthma with exacerbation  (primary encounter diagnosis)  Comment:   Plan: Asthma Action Plan (AAP)        She'll get pharmacy bp checks        Phone call duration:  14 minutes    Jose A Davila MD

## 2020-03-30 NOTE — TELEPHONE ENCOUNTER
Routing refill request to provider for review/approval because:  ACT is overdue      Lizet Correa RN, BSN, PHN

## 2020-04-09 DIAGNOSIS — J45.20 MILD INTERMITTENT ASTHMA, UNSPECIFIED WHETHER COMPLICATED: ICD-10-CM

## 2020-04-09 RX ORDER — ALBUTEROL SULFATE 0.83 MG/ML
SOLUTION RESPIRATORY (INHALATION)
Qty: 75 ML | Refills: 0 | Status: SHIPPED | OUTPATIENT
Start: 2020-04-09 | End: 2020-04-23

## 2020-04-20 DIAGNOSIS — J45.20 MILD INTERMITTENT ASTHMA, UNSPECIFIED WHETHER COMPLICATED: ICD-10-CM

## 2020-04-22 NOTE — TELEPHONE ENCOUNTER
Pt calling stating she is out of albuterol neb solution.  She has used the whole script from 4/9/2020 and her ACT is not up to date    Pt had a virtual visit on 3/30/2020    Please advise if you can refill    ACT Total Scores 4/21/2017 11/29/2017 8/21/2018   ACT TOTAL SCORE - - -   ASTHMA ER VISITS - - -   ASTHMA HOSPITALIZATIONS - - -   ACT TOTAL SCORE (Goal Greater than or Equal to 20) 25 25 17   In the past 12 months, how many times did you visit the emergency room for your asthma without being admitted to the hospital? 0 0 0   In the past 12 months, how many times were you hospitalized overnight because of your asthma? 0 0 0

## 2020-04-22 NOTE — TELEPHONE ENCOUNTER
Pt is out of albuterol (PROVENTIL) (2.5 MG/3ML) 0.083% neb solution and needs filled as soon as possible.    Orly Roper Patient Representative

## 2020-04-23 ENCOUNTER — OFFICE VISIT (OUTPATIENT)
Dept: FAMILY MEDICINE | Facility: CLINIC | Age: 60
End: 2020-04-23
Payer: COMMERCIAL

## 2020-04-23 VITALS
HEART RATE: 79 BPM | SYSTOLIC BLOOD PRESSURE: 155 MMHG | DIASTOLIC BLOOD PRESSURE: 87 MMHG | WEIGHT: 161.9 LBS | TEMPERATURE: 97.9 F | OXYGEN SATURATION: 96 % | BODY MASS INDEX: 26.94 KG/M2

## 2020-04-23 DIAGNOSIS — I10 ESSENTIAL HYPERTENSION: ICD-10-CM

## 2020-04-23 DIAGNOSIS — J45.41 MODERATE PERSISTENT ASTHMA WITH ACUTE EXACERBATION: Primary | ICD-10-CM

## 2020-04-23 PROCEDURE — 99214 OFFICE O/P EST MOD 30 MIN: CPT | Performed by: FAMILY MEDICINE

## 2020-04-23 RX ORDER — PREDNISONE 20 MG/1
40 TABLET ORAL DAILY
Qty: 10 TABLET | Refills: 0 | Status: SHIPPED | OUTPATIENT
Start: 2020-04-23 | End: 2020-05-13

## 2020-04-23 RX ORDER — LEVOFLOXACIN 750 MG/1
750 TABLET, FILM COATED ORAL DAILY
Qty: 7 TABLET | Refills: 0 | Status: SHIPPED | OUTPATIENT
Start: 2020-04-23 | End: 2020-05-13

## 2020-04-23 RX ORDER — ALBUTEROL SULFATE 0.83 MG/ML
SOLUTION RESPIRATORY (INHALATION)
Qty: 75 ML | Refills: 0 | Status: SHIPPED | OUTPATIENT
Start: 2020-04-23 | End: 2020-05-13

## 2020-04-23 ASSESSMENT — ASTHMA QUESTIONNAIRES
QUESTION_1 LAST FOUR WEEKS HOW MUCH OF THE TIME DID YOUR ASTHMA KEEP YOU FROM GETTING AS MUCH DONE AT WORK, SCHOOL OR AT HOME: SOME OF THE TIME
QUESTION_4 LAST FOUR WEEKS HOW OFTEN HAVE YOU USED YOUR RESCUE INHALER OR NEBULIZER MEDICATION (SUCH AS ALBUTEROL): THREE OR MORE TIMES PER DAY
QUESTION_5 LAST FOUR WEEKS HOW WOULD YOU RATE YOUR ASTHMA CONTROL: NOT CONTROLLED AT ALL
EMERGENCY_ROOM_LAST_YEAR_TOTAL: ONE
QUESTION_2 LAST FOUR WEEKS HOW OFTEN HAVE YOU HAD SHORTNESS OF BREATH: MORE THAN ONCE A DAY
ACT_TOTALSCORE: 7
QUESTION_3 LAST FOUR WEEKS HOW OFTEN DID YOUR ASTHMA SYMPTOMS (WHEEZING, COUGHING, SHORTNESS OF BREATH, CHEST TIGHTNESS OR PAIN) WAKE YOU UP AT NIGHT OR EARLIER THAN USUAL IN THE MORNING: FOUR OR MORE NIGHTS A WEEK

## 2020-04-23 NOTE — TELEPHONE ENCOUNTER
Called pt, discussed, went thru box of neb in 2 weeks in 2 weeks, thought was scheduled, discussed prn, appointment made today, pt now on controller  Yudith Lujan RN, BSN  Message handled by Nurse Triage.

## 2020-04-23 NOTE — PROGRESS NOTES
Subjective     Miguelina Soria is a 59 year old female who presents to clinic today for the following health issues:    HPI   Asthma Follow-Up    Was ACT completed today?    Yes    ACT Total Scores 4/23/2020   ACT TOTAL SCORE -   ASTHMA ER VISITS -   ASTHMA HOSPITALIZATIONS -   ACT TOTAL SCORE (Goal Greater than or Equal to 20) 7   In the past 12 months, how many times did you visit the emergency room for your asthma without being admitted to the hospital? 1   In the past 12 months, how many times were you hospitalized overnight because of your asthma? 0       Needed refills for albuterol, was told that she is using too much- went through a box of nebulizers in two weeks.  Poor ACT score, uncontrolled asthma.  Says she just wants to be able to breathe again.  No fevers or chills, no URI symptoms.  Non-productive cough.  Has been going on for several weeks, had ER visit on 3-27-20 with acute exacerbation, did video visit on 3-30-20 and was much improved, however her breathing has gotten worse since then.    BP elevated today, possibly due to albuterol use.  Recommended she watch her BP, as she may need medication adjustments.      How many days per week do you miss taking your asthma controller medication?  0    Please describe any recent triggers for your asthma: activity    Have you had any Emergency Room Visits, Urgent Care Visits, or Hospital Admissions since your last office visit?  Yes  Number of ER or Urgent Care visits for asthma: had the influenza A      How many servings of fruits and vegetables do you eat daily?  2-3    On average, how many sweetened beverages do you drink each day (Examples: soda, juice, sweet tea, etc.  Do NOT count diet or artificially sweetened beverages)?   2    How many days per week do you exercise enough to make your heart beat faster? 3 or less    How many minutes a day do you exercise enough to make your heart beat faster? 9 or less    How many days per week do you miss taking your  medication? 0      Current Outpatient Medications   Medication Sig Dispense Refill     albuterol (PROVENTIL) (2.5 MG/3ML) 0.083% neb solution USE 1 VIAL VIA NEBULIZER EVERY 6 HOURS AS NEEDED FOR SHORTNESS OF BREATH OR WHEEZING 75 mL 0     fluticasone-salmeterol (ADVAIR) 100-50 MCG/DOSE inhaler Inhale 1 puff into the lungs every 12 hours 1 Inhaler 3     ibuprofen (ADVIL/MOTRIN) 200 MG tablet Take 3 tablets (600 mg) by mouth every 6 hours as needed for mild pain or fever 60 tablet 0     levofloxacin (LEVAQUIN) 750 MG tablet Take 1 tablet (750 mg) by mouth daily for 7 days 7 tablet 0     losartan (COZAAR) 50 MG tablet Take 1 tablet (50 mg) by mouth daily 90 tablet 3     omeprazole (PRILOSEC) 20 MG DR capsule Take 1 capsule (20 mg) by mouth daily 90 capsule 3     predniSONE (DELTASONE) 20 MG tablet Take 2 tablets (40 mg) by mouth daily for 5 days 10 tablet 0     VENTOLIN  (90 Base) MCG/ACT inhaler INHALE 2 PUFFS BY MOUTH EVERY 4 HOURS AS NEEDED FOR SHORTNESS OF BREATH OR WHEEZING 18 g 3     zolpidem (AMBIEN) 5 MG tablet TAKE 1 TABLET BY MOUTH EVERY NIGHT AT BEDTIME AS NEEDED FOR SLEEP 30 tablet 2     metoprolol succinate ER (TOPROL-XL) 50 MG 24 hr tablet TAKE 1 TABLET(50 MG) BY MOUTH DAILY 90 tablet 0     No Known Allergies    Reviewed and updated as needed this visit by Provider  Tobacco  Allergies  Meds  Problems  Med Hx  Surg Hx  Fam Hx         Review of Systems   ROS COMP: Constitutional, HEENT, cardiovascular, pulmonary, gi and gu systems are negative, except as otherwise noted.      Objective    BP (!) 155/87 (BP Location: Right arm, Patient Position: Chair, Cuff Size: Adult Regular)   Pulse 79   Temp 97.9  F (36.6  C) (Oral)   Wt 73.4 kg (161 lb 14.4 oz)   LMP 01/01/2010   SpO2 96%   BMI 26.94 kg/m    Body mass index is 26.94 kg/m .  Physical Exam   GENERAL: alert and no distress  EYES: Eyes grossly normal to inspection, PERRL and conjunctivae and sclerae normal  RESP: expiratory wheezes  throughout, increased work of breathing but no distress.    CV: regular rate and rhythm  MS: no gross musculoskeletal defects noted, no edema  SKIN: no suspicious lesions or rashes  NEURO: mentation intact and speech normal  PSYCH: mentation appears normal, affect normal/bright    Diagnostic Test Results:  Labs reviewed in Epic        Assessment & Plan     1. Moderate persistent asthma with acute exacerbation  Patient has very poorly controlled asthma and probable acute exacerbation.  Will treat her with a course of oral prednisone and antibiotics.  We discussed ordering a CXR, however we will treat her regardless, and her O2 sats were ok.  Pt was in agreement with this decision, saying she has already had several CXRs.  Will also start her on Advair BID.  Follow up for asthma recheck in two weeks or sooner as needed.  Discussed appropriate return precautions if worsening.  - fluticasone-salmeterol (ADVAIR) 100-50 MCG/DOSE inhaler; Inhale 1 puff into the lungs every 12 hours  Dispense: 1 Inhaler; Refill: 3  - predniSONE (DELTASONE) 20 MG tablet; Take 2 tablets (40 mg) by mouth daily for 5 days  Dispense: 10 tablet; Refill: 0  - levofloxacin (LEVAQUIN) 750 MG tablet; Take 1 tablet (750 mg) by mouth daily for 7 days  Dispense: 7 tablet; Refill: 0    2. Essential hypertension  Unable to determine if elevated BP reading is due to excessive albuterol use.  Recommended close monitoring of BP and follow up with PCP.       There are no Patient Instructions on file for this visit.    Return in about 2 weeks (around 5/7/2020), or if symptoms worsen or fail to improve, for Medication Recheck-Asthma medication.    Rosa Elena Alves MD  [facility]

## 2020-04-23 NOTE — TELEPHONE ENCOUNTER
Rx sent in other provider's absence. Patient likely needs follow-up visit. Please have RN call pt.

## 2020-04-24 ASSESSMENT — ASTHMA QUESTIONNAIRES: ACT_TOTALSCORE: 7

## 2020-04-27 DIAGNOSIS — G47.9 SLEEP DISORDER: ICD-10-CM

## 2020-04-27 DIAGNOSIS — I10 ESSENTIAL HYPERTENSION: ICD-10-CM

## 2020-04-28 RX ORDER — ZOLPIDEM TARTRATE 5 MG/1
TABLET ORAL
Qty: 30 TABLET | Refills: 0 | Status: SHIPPED | OUTPATIENT
Start: 2020-04-28 | End: 2020-05-26

## 2020-04-28 RX ORDER — METOPROLOL SUCCINATE 50 MG/1
TABLET, EXTENDED RELEASE ORAL
Qty: 90 TABLET | Refills: 0 | Status: SHIPPED | OUTPATIENT
Start: 2020-04-28 | End: 2020-06-29

## 2020-04-28 NOTE — TELEPHONE ENCOUNTER
Routing refill request to provider for review/approval because:  Ambien not on [facility] protocol     Metoprolol   BP Readings from Last 6 Encounters:   04/23/20 (!) 155/87   03/27/20 (!) 174/103   01/29/20 (!) 148/88   01/23/20 128/61   01/23/20 (!) 140/80   09/17/19 138/82     Last visit 4/23/2020 Dr. Karie Tineo, Registered Nurse   [facility]

## 2020-05-11 ENCOUNTER — TELEPHONE (OUTPATIENT)
Dept: FAMILY MEDICINE | Facility: CLINIC | Age: 60
End: 2020-05-11

## 2020-05-11 NOTE — TELEPHONE ENCOUNTER
Pt need to Update ACT per dr.Coming Alves.ACT sent through my chart.Marleny Jackson MA  [facility].

## 2020-05-13 ENCOUNTER — OFFICE VISIT (OUTPATIENT)
Dept: FAMILY MEDICINE | Facility: CLINIC | Age: 60
End: 2020-05-13
Payer: COMMERCIAL

## 2020-05-13 ENCOUNTER — TELEPHONE (OUTPATIENT)
Dept: FAMILY MEDICINE | Facility: CLINIC | Age: 60
End: 2020-05-13

## 2020-05-13 VITALS
TEMPERATURE: 98.2 F | OXYGEN SATURATION: 94 % | WEIGHT: 163 LBS | SYSTOLIC BLOOD PRESSURE: 171 MMHG | BODY MASS INDEX: 27.12 KG/M2 | DIASTOLIC BLOOD PRESSURE: 96 MMHG | HEART RATE: 77 BPM

## 2020-05-13 DIAGNOSIS — Z59.9 FINANCIAL DIFFICULTIES: ICD-10-CM

## 2020-05-13 DIAGNOSIS — J45.41 MODERATE PERSISTENT ASTHMA WITH ACUTE EXACERBATION: Primary | ICD-10-CM

## 2020-05-13 DIAGNOSIS — I10 ESSENTIAL HYPERTENSION: ICD-10-CM

## 2020-05-13 PROCEDURE — 99214 OFFICE O/P EST MOD 30 MIN: CPT | Performed by: FAMILY MEDICINE

## 2020-05-13 RX ORDER — PREDNISONE 20 MG/1
40 TABLET ORAL DAILY
Qty: 10 TABLET | Refills: 0 | Status: SHIPPED | OUTPATIENT
Start: 2020-05-13 | End: 2020-05-18

## 2020-05-13 RX ORDER — ALBUTEROL SULFATE 0.83 MG/ML
2.5 SOLUTION RESPIRATORY (INHALATION) EVERY 4 HOURS PRN
Qty: 1 BOX | Refills: 1 | Status: SHIPPED | OUTPATIENT
Start: 2020-05-13

## 2020-05-13 SDOH — ECONOMIC STABILITY - INCOME SECURITY: PROBLEM RELATED TO HOUSING AND ECONOMIC CIRCUMSTANCES, UNSPECIFIED: Z59.9

## 2020-05-13 ASSESSMENT — PATIENT HEALTH QUESTIONNAIRE - PHQ9: SUM OF ALL RESPONSES TO PHQ QUESTIONS 1-9: 12

## 2020-05-13 NOTE — TELEPHONE ENCOUNTER
Called patient to schedule a f/u asthma appointment. Patient declined to schedule @ this time, but will call us back to schedulea f/u appointment per Dr. Soraya Alves.    Please call patient to schedule 4 week follow up visit for asthma.  I forgot to talk to her about it before she left.    Ruth Behrens.

## 2020-05-13 NOTE — PROGRESS NOTES
Subjective     Miguelina Soria is a 60 year old female who presents to clinic today for the following health issues:    HPI   Asthma Follow-Up    Was ACT completed today?    Yes    ACT Total Scores 5/13/2020   ACT TOTAL SCORE -   ASTHMA ER VISITS -   ASTHMA HOSPITALIZATIONS -   ACT TOTAL SCORE (Goal Greater than or Equal to 20) 9   In the past 12 months, how many times did you visit the emergency room for your asthma without being admitted to the hospital? 2   In the past 12 months, how many times were you hospitalized overnight because of your asthma? 0       How many days per week do you miss taking your asthma controller medication?  0    Please describe any recent triggers for your asthma: activity    Have you had any Emergency Room Visits, Urgent Care Visits, or Hospital Admissions since your last office visit?  No      How many servings of fruits and vegetables do you eat daily?  0-1    On average, how many sweetened beverages do you drink each day (Examples: soda, juice, sweet tea, etc.  Do NOT count diet or artificially sweetened beverages)?   2    How many days per week do you exercise enough to make your heart beat faster? 3 or less    How many minutes a day do you exercise enough to make your heart beat faster? 9 or less    How many days per week do you miss taking your medication? 0      Things not going well. Pt reports that she can't breathe again.  Did well on the antibiotics and prednisone, then after stopping meds, problems start again.  She reports that she did not fill Advair prescription because it was too expensive for her.  She has continued to use Albuterol Q4-Q1 hours PRN.  Yesterday she had very bad breathing.  Very SOB, a little bit of coughing, primarily at night.  No fevers or chills.  No other concerns.      Current Outpatient Medications   Medication Sig Dispense Refill     albuterol (PROVENTIL) (2.5 MG/3ML) 0.083% neb solution Take 1 vial (2.5 mg) by nebulization every 4 hours as needed  for shortness of breath / dyspnea or wheezing 1 Box 1     losartan (COZAAR) 50 MG tablet Take 1 tablet (50 mg) by mouth daily 90 tablet 3     metoprolol succinate ER (TOPROL-XL) 50 MG 24 hr tablet TAKE 1 TABLET(50 MG) BY MOUTH DAILY 90 tablet 0     predniSONE (DELTASONE) 20 MG tablet Take 2 tablets (40 mg) by mouth daily for 5 days 10 tablet 0     fluticasone-salmeterol (ADVAIR) 100-50 MCG/DOSE inhaler Inhale 1 puff into the lungs every 12 hours 1 Inhaler 3     fluticasone-vilanterol (BREO ELLIPTA) 100-25 MCG/INH inhaler Inhale 1 puff into the lungs daily 1 Inhaler 0     ibuprofen (ADVIL/MOTRIN) 200 MG tablet Take 3 tablets (600 mg) by mouth every 6 hours as needed for mild pain or fever 60 tablet 0     omeprazole (PRILOSEC) 20 MG DR capsule Take 1 capsule (20 mg) by mouth daily 90 capsule 3     zolpidem (AMBIEN) 5 MG tablet TAKE 1 TABLET BY MOUTH EVERY NIGHT AT BEDTIME AS NEEDED FOR SLEEP 30 tablet 0     No Known Allergies  BP Readings from Last 3 Encounters:   05/13/20 (!) 171/96   04/23/20 (!) 155/87   03/27/20 (!) 174/103    Wt Readings from Last 3 Encounters:   05/13/20 73.9 kg (163 lb)   04/23/20 73.4 kg (161 lb 14.4 oz)   03/27/20 74.8 kg (165 lb)                    Reviewed and updated as needed this visit by Provider  Meds  Problems         Review of Systems   Constitutional, HEENT, cardiovascular, pulmonary, gi and gu systems are negative, except as otherwise noted.      Objective    BP (!) 171/96   Pulse 77   Temp 98.2  F (36.8  C) (Oral)   Wt 73.9 kg (163 lb)   LMP 01/01/2010   SpO2 94%   BMI 27.12 kg/m    Body mass index is 27.12 kg/m .  Physical Exam   GENERAL: healthy and no distress  EYES: Eyes grossly normal to inspection, conjunctivae and sclerae normal  RESP: Diffuse wheezing in all lung fields, no respiratory distress, but increased work of breathing. Able to speak in full sentences.  CV: regular rate and rhythm  MS: no gross musculoskeletal defects noted, no edema  SKIN: no suspicious  lesions or rashes  PSYCH: mentation appears normal, affect normal/bright    Diagnostic Test Results:  none         Assessment & Plan     1. Moderate persistent asthma with acute exacerbation  Unfortunately patient could not afford her advair inhaler.  I went online with her and found some possible coupons for advair and breo ellipta to decrease her cost.  I have also asked her to speak with her pharmacy or her insurance company to find out which steroid inhaler is the cheapest with her insurance.  I feel this is vital to her health.  I do not feel that another course of antibiotics is needed at this time, but I will do another course of prednisone in the mean time.  I am also placing a care coordination referral to see if there is any assistance with prescriptions that the patient might be eligible for.  Follow up 4 weeks or sooner as needed.  Discussed appropriate return precautions.  No nebulizer treatment due to COVID-19 outbreak.  - fluticasone-vilanterol (BREO ELLIPTA) 100-25 MCG/INH inhaler; Inhale 1 puff into the lungs daily  Dispense: 1 Inhaler; Refill: 0  - albuterol (PROVENTIL) (2.5 MG/3ML) 0.083% neb solution; Take 1 vial (2.5 mg) by nebulization every 4 hours as needed for shortness of breath / dyspnea or wheezing  Dispense: 1 Box; Refill: 1  - predniSONE (DELTASONE) 20 MG tablet; Take 2 tablets (40 mg) by mouth daily for 5 days  Dispense: 10 tablet; Refill: 0  - CARE COORDINATION REFERRAL    2. Financial difficulties  Care coordination referral placed for prescription assistance.    3.  Essential Hypertension  Patient had elevated blood pressure again, however she continues to use excessive albuterol.  She has been taking her medications.  We briefly discussed her blood pressure and the need to get this under better control.  Will follow up at next visit.       There are no Patient Instructions on file for this visit.    Return in about 4 weeks (around 6/10/2020), or if symptoms worsen or fail to  improve, for Medication Recheck- Asthma.    Rosa Elena Alves MD  [facility]

## 2020-05-14 ENCOUNTER — PATIENT OUTREACH (OUTPATIENT)
Dept: CARE COORDINATION | Facility: CLINIC | Age: 60
End: 2020-05-14

## 2020-05-14 ASSESSMENT — ASTHMA QUESTIONNAIRES: ACT_TOTALSCORE: 9

## 2020-05-14 NOTE — PROGRESS NOTES
Clinic Care Coordination Contact  [facility]/Voicemail     Clinical Data:  CC Outreach  Outreach attempted on 05/14/20: Left message on patient's voicemail with call back information and requested return call.  Additional Information: I am helping cover CC this week for [facility], primary  CC is Lillie Lares.   Plan:I left my contact information on patient's voice mail.     Noreen Cain [facility]  Pronouns: She/Her/Hers  , Care Coordination  [facility]  [phone]

## 2020-05-20 NOTE — PROGRESS NOTES
"Clinic Care Coordination Contact  [facility]/Voicemail     Clinical Data: [place] [facility] CC Outreach  Outreach attempted on 05/20/20: Miguelina left me a voice message returning my call. In her message she said she paid out of pocket for the medication last month and has significant improvement with her asthma symptoms, stating \"I can breathe.\" She doesn't think she can afford the medication long term. I attempted to reach her today to follow-up and left a voice message.   Plan: Plan to provide patient with the  Pharmacy Assistance Prescription Program Phone number, [phone], when she calls back.     JESUS Bautista  Pronouns: She/Her/Hers  , Care Coordination  [facility]  [phone]      "

## 2020-05-20 NOTE — PROGRESS NOTES
Clinic Care Coordination Contact    Follow Up Progress Note   Telephone contact with Miguelina this afternoon. She has prescription benefits with her insurance but the medication is still $122/month. She was told without a prescription it would be $400+. The medication is helping her and she reports with it her breathing is 100% better. She works as a  and with her regular bills cannot afford the medication monthly. She agreed to call the  Prescription Assistance Program.      Assessment: Patient pleasant and appropriate.     Goals addressed this encounter: Financial assistance to help pay for medications    Intervention/Education provided during outreach: Resource and referral     Outreach Frequency: Not applicable    Plan: No further [facility] CC needs anticipated. Patient has my contact information.    JESUS Bautista  Pronouns: She/Her/Hers  , Care Coordination  [facility]  [phone]

## 2020-05-25 DIAGNOSIS — G47.9 SLEEP DISORDER: ICD-10-CM

## 2020-05-26 RX ORDER — ZOLPIDEM TARTRATE 5 MG/1
TABLET ORAL
Qty: 30 TABLET | Refills: 1 | Status: SHIPPED | OUTPATIENT
Start: 2020-05-26 | End: 2020-07-20

## 2020-06-29 DIAGNOSIS — I10 ESSENTIAL HYPERTENSION: ICD-10-CM

## 2020-06-29 RX ORDER — METOPROLOL SUCCINATE 50 MG/1
50 TABLET, EXTENDED RELEASE ORAL DAILY
Qty: 90 TABLET | Refills: 1 | Status: SHIPPED | OUTPATIENT
Start: 2020-06-29 | End: 2020-12-07

## 2020-06-29 NOTE — TELEPHONE ENCOUNTER
Routing refill request to provider for review/approval because:  Labs out of range:  BP elevated

## 2020-07-20 DIAGNOSIS — G47.9 SLEEP DISORDER: ICD-10-CM

## 2020-07-20 RX ORDER — ZOLPIDEM TARTRATE 5 MG/1
TABLET ORAL
Qty: 30 TABLET | Refills: 0 | Status: SHIPPED | OUTPATIENT
Start: 2020-07-20 | End: 2020-08-13

## 2020-08-13 DIAGNOSIS — G47.9 SLEEP DISORDER: ICD-10-CM

## 2020-08-13 RX ORDER — ZOLPIDEM TARTRATE 5 MG/1
TABLET ORAL
Qty: 30 TABLET | Refills: 0 | Status: SHIPPED | OUTPATIENT
Start: 2020-08-13 | End: 2020-09-11

## 2020-08-13 NOTE — TELEPHONE ENCOUNTER
Routing refill request to provider for review/approval because:  Drug not on the [facility] refill protocol     Funmilayo Cruz RN

## 2020-09-11 DIAGNOSIS — G47.9 SLEEP DISORDER: ICD-10-CM

## 2020-09-11 RX ORDER — ZOLPIDEM TARTRATE 5 MG/1
TABLET ORAL
Qty: 30 TABLET | Refills: 0 | Status: SHIPPED | OUTPATIENT
Start: 2020-09-11 | End: 2020-10-11

## 2020-09-11 NOTE — TELEPHONE ENCOUNTER
Routing refill request to provider for review/approval because:  Drug not on the [facility] refill protocol     Sarita Elliott RN           Will recheck lipid panel  Instructed patient on diet and exercise

## 2020-09-15 ENCOUNTER — TELEPHONE (OUTPATIENT)
Dept: FAMILY MEDICINE | Facility: CLINIC | Age: 60
End: 2020-09-15

## 2020-09-15 NOTE — TELEPHONE ENCOUNTER
Patient Quality Outreach Summary      Summary:    Patient is due/failing the following:   PHQ-9 Needed    Type of outreach:    Phone, left message for patient/parent to call back.    Questions for provider review:    None                                                                                                                    TIM Dewitt       Chart routed to Care Team.

## 2020-10-10 DIAGNOSIS — G47.9 SLEEP DISORDER: ICD-10-CM

## 2020-10-11 RX ORDER — ZOLPIDEM TARTRATE 5 MG/1
TABLET ORAL
Qty: 30 TABLET | Refills: 0 | Status: SHIPPED | OUTPATIENT
Start: 2020-10-11 | End: 2020-11-09

## 2020-11-29 ENCOUNTER — HEALTH MAINTENANCE LETTER (OUTPATIENT)
Age: 60
End: 2020-11-29

## 2020-12-05 DIAGNOSIS — I10 ESSENTIAL HYPERTENSION: ICD-10-CM

## 2020-12-07 RX ORDER — METOPROLOL SUCCINATE 50 MG/1
TABLET, EXTENDED RELEASE ORAL
Qty: 90 TABLET | Refills: 0 | Status: SHIPPED | OUTPATIENT
Start: 2020-12-07 | End: 2021-03-02

## 2020-12-07 RX ORDER — LOSARTAN POTASSIUM 50 MG/1
TABLET ORAL
Qty: 30 TABLET | Refills: 0 | Status: SHIPPED | OUTPATIENT
Start: 2020-12-07

## 2020-12-07 NOTE — TELEPHONE ENCOUNTER
Routing refill request to provider for review/approval because:  Elevated BP    Myla Martin RN on 12/7/2020 at 9:45 AM

## 2021-01-21 NOTE — TELEPHONE ENCOUNTER
Panel Management Review      Patient has the following on her problem list:     Depression / Dysthymia review    Measure:  Needs PHQ-9 score of 4 or less during index window.  Administer PHQ-9 and if score is 5 or more, send encounter to provider for next steps.      PHQ-9 SCORE 11/29/2017 2/12/2018 8/21/2018   PHQ-9 Total Score - - -   PHQ-9 Total Score MyChart - 13 (Moderate depression) 7 (Mild depression)   PHQ-9 Total Score 10 13 7       If PHQ-9 recheck is 5 or more, route to provider for next steps.    Patient is due for:  None    Hypertension   Last three blood pressure readings:  BP Readings from Last 3 Encounters:   08/21/18 170/90   02/12/18 138/78   11/29/17 (!) 150/100     Blood pressure: FAILED    HTN Guidelines:  Age 18-59 BP range:  Less than 140/90  Age 60-85 with Diabetes:  Less than 140/90  Age 60-85 without Diabetes:  less than 150/90      Composite cancer screening  Chart review shows that this patient is due/due soon for the following Pap Smear  Summary:    Patient is due/failing the following:   BP CHECK and PAP    Action needed:   Patient needs office visit for physical and pap also blood pressure follow up.    Type of outreach:    routed to panel pool for outreach    Questions for provider review:    None                                                                                                                                    Katheryn Schneider       Chart routed to Care Team .           01/21/21 1:18 PM     See documentation in the [employer] SmartForm       George Starks MA

## 2021-02-27 DIAGNOSIS — I10 ESSENTIAL HYPERTENSION: ICD-10-CM

## 2021-02-27 DIAGNOSIS — G47.9 SLEEP DISORDER: ICD-10-CM

## 2021-03-01 NOTE — TELEPHONE ENCOUNTER
Routing refill request to provider for review/approval because:  Drug not on the [facility] refill protocol   Elevated BP    Myla Martin RN on 3/1/2021 at 9:45 AM

## 2021-03-02 RX ORDER — ZOLPIDEM TARTRATE 5 MG/1
TABLET ORAL
Qty: 30 TABLET | Refills: 0 | Status: SHIPPED | OUTPATIENT
Start: 2021-03-02 | End: 2021-03-30

## 2021-03-02 RX ORDER — METOPROLOL SUCCINATE 50 MG/1
TABLET, EXTENDED RELEASE ORAL
Qty: 90 TABLET | Refills: 0 | Status: SHIPPED | OUTPATIENT
Start: 2021-03-02

## 2021-03-21 NOTE — LETTER
"[facility]  [address] [place], [address]  [phone]    February 15, 2018       Miguelina Soria                                                                                                                                          [address] [place] [address]      Dear Miguelina:    Cholesterol is way high and we need to start you on a once daily cholesterol pill, Let me know what you think. Cholesterol can damage our heart attack and stroke risk.  The results of your latest lipid tests as attached.    LDL is the \"bad\" cholesterol linked to heart disease and stroke.   HDL is the \"good\" cholesterol and when it is high, it decreases the risk for above problems.    Results for orders placed or performed in visit on 02/12/18   Lipid panel reflex to direct LDL Fasting   Result Value Ref Range    Cholesterol 325 (H) <200 mg/dL    Triglycerides 215 (H) <150 mg/dL    HDL Cholesterol 61 >49 mg/dL    LDL Cholesterol Calculated 221 (H) <100 mg/dL    Non HDL Cholesterol 264 (H) <130 mg/dL   Comprehensive metabolic panel   Result Value Ref Range    Sodium 139 133 - 144 mmol/L    Potassium 4.3 3.4 - 5.3 mmol/L    Chloride 104 94 - 109 mmol/L    Carbon Dioxide 27 20 - 32 mmol/L    Anion Gap 8 3 - 14 mmol/L    Glucose 101 (H) 70 - 99 mg/dL    Urea Nitrogen 11 7 - 30 mg/dL    Creatinine 0.59 0.52 - 1.04 mg/dL    GFR Estimate >90 >60 mL/min/1.7m2    GFR Estimate If Black >90 >60 mL/min/1.7m2    Calcium 9.6 8.5 - 10.1 mg/dL    Bilirubin Total 0.4 0.2 - 1.3 mg/dL    Albumin 4.0 3.4 - 5.0 g/dL    Protein Total 7.4 6.8 - 8.8 g/dL    Alkaline Phosphatase 80 40 - 150 U/L    ALT 20 0 - 50 U/L    AST 16 0 - 45 U/L         Follow a low-fat, low-cholesterol diet and get regular exercise.  Please feel free to call the clinic at [phone] if you have any questions.    Sincerely,    Jose A Davila MD    " no

## 2021-03-30 DIAGNOSIS — G47.9 SLEEP DISORDER: ICD-10-CM

## 2021-03-30 RX ORDER — ZOLPIDEM TARTRATE 5 MG/1
TABLET ORAL
Qty: 30 TABLET | Refills: 0 | Status: SHIPPED | OUTPATIENT
Start: 2021-03-30 | End: 2021-04-26

## 2021-04-02 DIAGNOSIS — J45.41 MODERATE PERSISTENT ASTHMA WITH ACUTE EXACERBATION: ICD-10-CM

## 2021-04-02 NOTE — TELEPHONE ENCOUNTER
Routing refill request to provider for review/approval because:  Patient needs to be seen because:  Failing visit  Failing ACT    Sarita Elliott RN

## 2021-04-26 DIAGNOSIS — G47.9 SLEEP DISORDER: ICD-10-CM

## 2021-04-26 RX ORDER — ZOLPIDEM TARTRATE 5 MG/1
TABLET ORAL
Qty: 30 TABLET | Refills: 0 | Status: SHIPPED | OUTPATIENT
Start: 2021-04-26

## 2021-04-26 NOTE — TELEPHONE ENCOUNTER
Routing refill request to provider for review/approval because:  Drug not on the [facility] refill protocol     Sarita Elliott RN

## 2021-04-27 DIAGNOSIS — J45.41 MODERATE PERSISTENT ASTHMA WITH ACUTE EXACERBATION: ICD-10-CM

## 2021-04-27 NOTE — TELEPHONE ENCOUNTER
Routing refill request to provider for review/approval because:  Labs out of range:  ACT  Labs not current:  ACT  Patient needs to be seen because it has been more than 1 year since last office visit.      Karthik HOLGUIN RN

## 2021-09-25 ENCOUNTER — HEALTH MAINTENANCE LETTER (OUTPATIENT)
Age: 61
End: 2021-09-25

## 2021-11-20 ENCOUNTER — HEALTH MAINTENANCE LETTER (OUTPATIENT)
Age: 61
End: 2021-11-20

## 2022-01-15 ENCOUNTER — HEALTH MAINTENANCE LETTER (OUTPATIENT)
Age: 62
End: 2022-01-15

## 2022-02-17 PROBLEM — G89.29 OTHER CHRONIC PAIN: Status: ACTIVE | Noted: 2018-02-27

## 2022-12-26 ENCOUNTER — HEALTH MAINTENANCE LETTER (OUTPATIENT)
Age: 62
End: 2022-12-26

## 2023-04-22 ENCOUNTER — HEALTH MAINTENANCE LETTER (OUTPATIENT)
Age: 63
End: 2023-04-22

## 2023-07-09 ENCOUNTER — HEALTH MAINTENANCE LETTER (OUTPATIENT)
Age: 63
End: 2023-07-09

## 2023-07-25 NOTE — TELEPHONE ENCOUNTER
Controlled Substance Refill Request for Tramadol  Problem List Complete:  No     PROVIDER TO CONSIDER COMPLETION OF PROBLEM LIST AND OVERVIEW/CONTROLLED SUBSTANCE AGREEMENT    Last Written Prescription Date:  09/06/2016  Last Fill Quantity: 90,12/11/2016   # refills: 2    [place] [facility] [place]: 09/06/2016-Dr Davila    Future Office visit:     Controlled substance agreement on file: Yes:  Date 5/9/2016.     Processing:  Fax Rx to [facility] pharmacy   checked in past 6 months?  No, route to RN     appears normal and intact

## 2023-11-26 ENCOUNTER — HEALTH MAINTENANCE LETTER (OUTPATIENT)
Age: 63
End: 2023-11-26